# Patient Record
Sex: MALE | Race: BLACK OR AFRICAN AMERICAN | NOT HISPANIC OR LATINO | Employment: UNEMPLOYED | URBAN - METROPOLITAN AREA
[De-identification: names, ages, dates, MRNs, and addresses within clinical notes are randomized per-mention and may not be internally consistent; named-entity substitution may affect disease eponyms.]

---

## 2017-03-27 ENCOUNTER — TELEPHONE (OUTPATIENT)
Dept: PODIATRY | Facility: CLINIC | Age: 62
End: 2017-03-27

## 2017-03-27 ENCOUNTER — OFFICE VISIT (OUTPATIENT)
Dept: PODIATRY | Facility: CLINIC | Age: 62
End: 2017-03-27
Payer: COMMERCIAL

## 2017-03-27 VITALS
WEIGHT: 185 LBS | DIASTOLIC BLOOD PRESSURE: 62 MMHG | BODY MASS INDEX: 27.4 KG/M2 | HEART RATE: 88 BPM | SYSTOLIC BLOOD PRESSURE: 104 MMHG | HEIGHT: 69 IN

## 2017-03-27 DIAGNOSIS — B35.3 TINEA PEDIS OF RIGHT FOOT: ICD-10-CM

## 2017-03-27 DIAGNOSIS — B35.3 TINEA PEDIS OF RIGHT FOOT: Primary | ICD-10-CM

## 2017-03-27 DIAGNOSIS — L03.119 CELLULITIS OF FOOT: ICD-10-CM

## 2017-03-27 DIAGNOSIS — B35.4 TINEA CORPORIS: ICD-10-CM

## 2017-03-27 PROCEDURE — 99999 PR PBB SHADOW E&M-EST. PATIENT-LVL IV: CPT | Mod: PBBFAC,,, | Performed by: PODIATRIST

## 2017-03-27 PROCEDURE — 99213 OFFICE O/P EST LOW 20 MIN: CPT | Mod: S$GLB,,, | Performed by: PODIATRIST

## 2017-03-27 PROCEDURE — 87077 CULTURE AEROBIC IDENTIFY: CPT

## 2017-03-27 PROCEDURE — 1160F RVW MEDS BY RX/DR IN RCRD: CPT | Mod: S$GLB,,, | Performed by: PODIATRIST

## 2017-03-27 PROCEDURE — 87070 CULTURE OTHR SPECIMN AEROBIC: CPT

## 2017-03-27 PROCEDURE — 87186 SC STD MICRODIL/AGAR DIL: CPT

## 2017-03-27 RX ORDER — CICLOPIROX 7.7 MG/G
GEL TOPICAL 2 TIMES DAILY
Qty: 45 TUBE | Refills: 2 | Status: SHIPPED | OUTPATIENT
Start: 2017-03-27 | End: 2018-03-23

## 2017-03-27 RX ORDER — TERBINAFINE HYDROCHLORIDE 250 MG/1
TABLET ORAL
Qty: 14 TABLET | Refills: 0 | Status: SHIPPED | OUTPATIENT
Start: 2017-03-27 | End: 2018-03-23

## 2017-03-27 NOTE — PATIENT INSTRUCTIONS
Fungal Skin Infection (Tinea)  A fungal infection is when too much fungus grows on or in the body. Fungus normally lives on the skin in small amounts and does not cause harm. But when too much grows on the skin, it causes an infection. This is also known as tinea. Fungal skin infections are common and not often serious.  The infection often starts as a small red area the size of a pea. The skin may turn dry and flaky. The area may itch. As the fungus grows, it spreads out in a red [place]. Because of how it looks, fungal skin infection is often called ringworm, but it is not caused by a worm. Fungal skin infections can occur on many parts of the body. They can grow on the head, chest, arms, or legs. They can occur on the buttocks. On the feet, fungal infection is known as athletes foot. It causes itchy, sometimes painful sores between the toes and the bottom or sides of the feet. In the groin, the rash is called jock itch.  People with weakened immune systems can get a fungal infection more easily. This includes people with diabetes or HIV, or who are being treated for cancer. In these cases, the fungal infection can spread and cause severe illness. Fungal infections are also more common in people who are obese.  In most cases, treatment is done with antifungal cream or ointment. If the infection is on your scalp, you may take oral medication. In some cases, a tiny piece of the skin (biopsy) may be taken. This is so it can be tested in a lab.  Common fungal infections are treated with creams on the skin or oral medicine.  Home care  Follow all instructions when using antifungal cream or ointment on your skin. The health care provider may advise using cornstarch powder to keep your skin dry or petroleum jelly to provide a barrier.  General care:  · If you were prescribed an oral medicine, read the patient information. Talk with the health care provider about the risks and side effects.  · Let your skin dry  completely after bathing. Carefully dry your feet and between your toes.  · Dress in loose cotton clothing.  · Dont scratch the affected area. This can delay healing and may spread the infection. It can also cause a bacterial infection.  · Keep your skin clean, but dont wash the skin too much. This can irritate your skin.  · Keep in mind that it may take a week before the fungus starts to go away. It can take 2 to 4 weeks to fully clear. To prevent it from coming back, use the medicine until the rash is all gone.  Follow-up care  Follow up with your health care provider if the rash does not get better after 10 days of treatment. Also follow up if the rash spreads to other parts of your body.  When to seek medical advice  Call your health care provider right away if any of these occur:  · Fever of 100.4°F (38°C) or higher  · Redness or swelling that gets worse  · Pain that gets worse  · Foul-smelling fluid leaking from the skin  Date Last Reviewed: 7/23/2014 © 2000-2016 [employer]. [address] [place] [address]. All rights reserved. This information is not intended as a substitute for professional medical care. Always follow your healthcare professional's instructions.        Ringworm of the Skin    Ringworm is a fungal infection of the skin. Despite the name, a worm doesn't cause it. The cause of ringworm is a fungus that infects the outer layers of the skin. It is also not caused by bed bugs, scabies, or lice. These are totally different.  The medical term for ringworm is tinea. It can affect most parts of your body, although it seems to do better in moist areas of the body and around hair. It can be on almost any part of your body, including:  · Arms, hands, legs, chest, feet, and back  · Scalp  · Beard  · Groin  · Between the toes  Depending on where it is located, sometimes the name changes:  · Tinea capitis (scalp)  · Tinea cruris (groin)  · Tinea corporis (body)  · Tinea pedis  (feet)  Causes  Ringworm is very common all over the world, including the [place] It can take less than 1 week up to 2 weeks before you develop the infection after being exposed. So, you may not figure out the exact cause.  It is spread through direct contact with:  · An infected person or animal  · Infected soil, or objects such as towels, clothing, and bailey  Symptoms  At first you might not notice ringworm. Or you may just see a small, red, often raised itchy spot or pimple. Sometimes there may only be one spot. At other times there may be several. Ringworm can look slightly different on different parts of the body, but there are some things are always present:  · Irregular, round, oval or ring-shaped, which is why it's called ringworm  · Clearer or lighter color at the center, since it spreads from the center of the spot outward  · Red or inflamed look  · Raised  · Itchy  · Scaly, dry, or flaky  Home care  Follow these tips to help care for yourself at home:  · Leave it alone. Don't scratch at the rash or pick it. This can increase the chance of infection and scarring.  · Take medicine as prescribed. If you were prescribed a cream, apply it exactly as directed. Make sure to put the cream not just on the rash, but also on the skin 1 or 2 inches around it. Medicine by mouth is sometimes needed, particularly for ringworm on the scalp. Take it as directed and until your healthcare provider says to stop.  · Keep it from spreading to others. Untreated ringworm of the skin is contagious by skin-to-skin contact. Your child may return to school 2 days after treatment has started.  Prevention  To some degree, prevention depends on what part of your body was affected. In general, the following good hygiene can help.  · Clean up after you get dirty or sweaty, or after using a locker room.  · When possible, dont share bailey and brushes.  · Avoid having your skin and feet wet or damp for long periods.  · Wear clean,  loose-fitting underwear.  Follow-up care  Follow up with your healthcare provider as advised by our staff if the rash does not improve after 10 days of treatment or if the rash spreads to other areas of the body.  When to seek medical advice  Call your healthcare provider right away if any of these occur:  · Redness around the rash gets worse  · Fluid drains from the rash  · Fever of 100.4ºF (38ºC) or higher, or as directed by your healthcare provider  Date Last Reviewed: 8/1/2016 © 2000-2016 [employer]. [address] [place] [address]. All rights reserved. This information is not intended as a substitute for professional medical care. Always follow your healthcare professional's instructions.

## 2017-03-27 NOTE — PROGRESS NOTES
Subjective:      Patient ID: Gonzalez Cuevas is a 61 y.o. male.    Chief Complaint: Foot Problem (right ft.)    Gonzalez is a 61 y.o. male who presents with new rash/infection/blister at right ankle/leg. This started about 3 weeks ago with a growing lesion at right anterior shin. Several smaller spots has started in the last week. He was using topical antifungal with improvement noted but has run out. History of athletes foot and ring work at left arm. He gets the since of a breakout at the right and left arm. Denies injury.    Review of Systems   Constitution: Negative for chills, fever, weakness, malaise/fatigue and night sweats.   Cardiovascular: Negative for chest pain, leg swelling, orthopnea and palpitations.   Respiratory: Negative for cough, shortness of breath and wheezing.    Skin: Positive for itching and rash. Negative for color change.   Musculoskeletal: Negative for arthritis, gout, joint pain, joint swelling, muscle weakness and myalgias.   Gastrointestinal: Negative for abdominal pain, constipation and nausea.   Neurological: Negative for disturbances in coordination, dizziness, focal weakness, numbness and tremors.           Objective:      Physical Exam   Constitutional: He is oriented to person, place, and time. He appears well-developed. No distress.   HENT:   Head: Normocephalic and atraumatic.   Cardiovascular: Normal rate, regular rhythm and normal heart sounds.  Exam reveals no friction rub.    No murmur heard.  Pulses:       Dorsalis pedis pulses are 2+ on the right side, and 2+ on the left side.        Posterior tibial pulses are 2+ on the right side, and 2+ on the left side.   Pulmonary/Chest: Effort normal and breath sounds normal. No respiratory distress. He has no wheezes. He has no rales.   Abdominal: Soft. Bowel sounds are normal. He exhibits no distension. There is no tenderness. There is no guarding.   Musculoskeletal:        Right ankle: Normal. He exhibits no swelling, no  ecchymosis and no deformity. No tenderness. Achilles tendon normal.        Left ankle: Normal. He exhibits normal range of motion, no swelling, no ecchymosis and no deformity. Achilles tendon normal.        Right foot: Normal. There is normal range of motion, no tenderness, no swelling, normal capillary refill, no crepitus and no deformity.        Left foot: There is normal range of motion, no tenderness, no swelling, normal capillary refill, no crepitus and no deformity.   Feet:   Right Foot:   Protective Sensation: 10 sites tested. 10 sites sensed.   Skin Integrity: Positive for blister, skin breakdown, erythema and warmth.   Left Foot:   Protective Sensation: 10 sites tested. 10 sites sensed.   Skin Integrity: Negative for ulcer, blister, skin breakdown, erythema, warmth or callus.   Neurological: He is alert and oriented to person, place, and time. No cranial nerve deficit. Coordination and gait normal.   Reflex Scores:       Achilles reflexes are 2+ on the right side and 2+ on the left side.  Sensation at feet intact to 10 g SWM at 10 areas bilat.     Skin: Skin is warm and intact. Lesion and rash noted. No ecchymosis and no laceration noted. Rash is vesicular. No erythema. No pallor.        No blisters or lesion at forefoot.    Nursing note and vitals reviewed.            Assessment:       Encounter Diagnoses   Name Primary?    Tinea pedis of right foot Yes    Tinea corporis - Right Foot          Plan:       Gonzalez was seen today for foot problem.    Diagnoses and all orders for this visit:    Tinea pedis of right foot  -     terbinafine HCl (LAMISIL) 250 mg tablet; 1 pill by mouth daily x 14 days. Avoid alcohol intake while taking medication    Tinea corporis - Right Foot  -     terbinafine HCl (LAMISIL) 250 mg tablet; 1 pill by mouth daily x 14 days. Avoid alcohol intake while taking medication  -     Aerobic culture  -     Ambulatory consult to Dermatology      I counseled the patient on his  conditions, their implications and medical management.    Topical antifungal TWICE DAILY for at least 1 month.  Side effects of medication(s) were discussed in detail and patient voiced understanding. Patient will call back for any issues or complications.       Blister cultured to rule out bacterial infection.    Referral to Derm as infection may be spreading to upper extremity.  .

## 2017-03-27 NOTE — TELEPHONE ENCOUNTER
----- Message from Paula Johnson sent at 3/27/2017  7:31 AM CDT -----  Contact: [phone]/ self   Pt its requesting an appointment for today in the main [place] clinic, pt states he has a rash all over his leg due a fungus. Please advise          Patient will be in the clinic today

## 2017-03-27 NOTE — MR AVS SNAPSHOT
[facility]  [address]  [facility] [address]  Phone: [phone]                  Gonzalez Cuevas   3/27/2017 2:15 PM   Office Visit    Description:  Male : 1955   Provider:  Amaury Barraza DPM   Department:  [facility]           Reason for Visit     Foot Problem           Diagnoses this Visit        Comments    Tinea pedis of right foot    -  Primary     Tinea corporis                To Do List           Goals (5 Years of Data)     None      [facility]     [facility] Care Line -  Assistance  Registered nurses in the [facility] provide clinical advisement, health education, appointment booking, and other advisory services.  Call for this free service at [phone].             Medications           Message regarding Medications     Verify the changes and/or additions to your medication regime listed below are the same as discussed with your clinician today.  If any of these changes or additions are incorrect, please notify your healthcare provider.             Verify that the below list of medications is an accurate representation of the medications you are currently taking.  If none reported, the list may be blank. If incorrect, please contact your healthcare provider. Carry this list with you in case of emergency.           Current Medications     ascorbic acid (VITAMIN C) 500 mg Chew Take 1 tablet by mouth as needed.    ciclopirox 0.77 % Gel Apply topically 2 (two) times daily.    cyclobenzaprine (FLEXERIL) 5 MG tablet Take 1 tablet (5 mg total) by mouth 3 (three) times daily as needed for Muscle spasms.    naproxen (NAPROSYN) 500 MG tablet Take 1 tablet (500 mg total) by mouth 2 (two) times daily.    predniSONE (DELTASONE) 10 MG tablet take 3 TABS every morning for 2 days then 2 TABS every morning fo...  (REFER TO PRESCRIPTION NOTES).    SAW PALMETTO ORAL Take 1 capsule by mouth once daily.            Clinical Reference  "Information           Your Vitals Were     BP Pulse Height Weight BMI    104/62 88 5' 9" (1.753 m) 83.9 kg (185 lb) 27.32 kg/m2      Blood Pressure          Most Recent Value    BP  104/62      Allergies as of 3/27/2017     No Known Allergies      Immunizations Administered on Date of Encounter - 3/27/2017     None      Language Assistance Services     ATTENTION: Language assistance services are available, free of charge. Please call [phone].      ATENCIÓN: Si habla español, tiene a patel disposición servicios gratuitos de asistencia lingüística. Llame al [phone].     ELIZABETH Ý: N?u b?n nói Ti?ng Vi?t, có các d?ch v? h? tr? ngôn ng? mi?n phí dành cho b?n. G?i s? [phone].         [facility] complies with applicable Federal civil rights laws and does not discriminate on the basis of race, color, national origin, age, disability, or sex.        "

## 2017-03-29 ENCOUNTER — NURSE TRIAGE (OUTPATIENT)
Dept: ADMINISTRATIVE | Facility: CLINIC | Age: 62
End: 2017-03-29

## 2017-03-29 LAB — BACTERIA SPEC AEROBE CULT: NORMAL

## 2017-03-29 RX ORDER — CEPHALEXIN 500 MG/1
500 CAPSULE ORAL EVERY 12 HOURS
Qty: 20 CAPSULE | Refills: 0 | Status: SHIPPED | OUTPATIENT
Start: 2017-03-29 | End: 2017-04-08

## 2017-03-29 NOTE — TELEPHONE ENCOUNTER
Reason for Disposition   Caller has NON-URGENT medication question about med that PCP prescribed and triager unable to answer question    Protocols used: ST MEDICATION QUESTION CALL-A-AH    Gonzalez's wife, Kristina, called to say she is concerned about the possible side effects of the cephalexin prescribed today by Dr Amaury Barraza for her 's staph infection / cellulitis.  She states Gonzalez  has had urinary retention in the past, and prostate problems.  She will pick this medication up, she said, and he will take the first dose tonight, but she would like a call from Dr Barraza as soon as possible tomorrow morning to discuss the side effects (possible), and to know if there is something else he can take without those possible SE.  Message to Dr Barraza. Please contact caller directly with any additional care advice.

## 2017-03-30 ENCOUNTER — TELEPHONE (OUTPATIENT)
Dept: PODIATRY | Facility: CLINIC | Age: 62
End: 2017-03-30

## 2017-03-30 NOTE — TELEPHONE ENCOUNTER
Spoke to pat wife and she said pat start to take meds last night. She said she hasn't get a message yet from the manager from dermatology and she want me to check on this. Told her if she sees that her  has any side effects to stop taking antibiotic

## 2017-04-04 ENCOUNTER — TELEPHONE (OUTPATIENT)
Dept: FAMILY MEDICINE | Facility: CLINIC | Age: 62
End: 2017-04-04

## 2017-04-04 ENCOUNTER — OFFICE VISIT (OUTPATIENT)
Dept: DERMATOLOGY | Facility: CLINIC | Age: 62
End: 2017-04-04
Payer: COMMERCIAL

## 2017-04-04 DIAGNOSIS — L30.0 NUMMULAR ECZEMATOUS DERMATITIS: Primary | ICD-10-CM

## 2017-04-04 DIAGNOSIS — L30.8 VESICULAR DERMATITIS: ICD-10-CM

## 2017-04-04 PROCEDURE — 99999 PR PBB SHADOW E&M-EST. PATIENT-LVL II: CPT | Mod: PBBFAC,,, | Performed by: DERMATOLOGY

## 2017-04-04 PROCEDURE — 99203 OFFICE O/P NEW LOW 30 MIN: CPT | Mod: S$GLB,,, | Performed by: DERMATOLOGY

## 2017-04-04 PROCEDURE — 87290 VARICELLA ZOSTER AG IF: CPT

## 2017-04-04 PROCEDURE — 1160F RVW MEDS BY RX/DR IN RCRD: CPT | Mod: S$GLB,,, | Performed by: DERMATOLOGY

## 2017-04-04 PROCEDURE — 87300 AG DETECTION POLYVAL IF: CPT

## 2017-04-04 RX ORDER — FLUOCINONIDE 0.5 MG/G
CREAM TOPICAL 2 TIMES DAILY
Qty: 60 G | Refills: 1 | Status: SHIPPED | OUTPATIENT
Start: 2017-04-04 | End: 2018-03-23

## 2017-04-04 RX ORDER — FLUOCINONIDE TOPICAL SOLUTION USP, 0.05% 0.5 MG/ML
SOLUTION TOPICAL 2 TIMES DAILY
Qty: 60 ML | Refills: 1 | Status: SHIPPED | OUTPATIENT
Start: 2017-04-04 | End: 2017-04-04 | Stop reason: ALTCHOICE

## 2017-04-04 RX ORDER — DOXYCYCLINE 100 MG/1
100 CAPSULE ORAL EVERY 12 HOURS
Qty: 28 CAPSULE | Refills: 0 | Status: SHIPPED | OUTPATIENT
Start: 2017-04-04 | End: 2017-04-18

## 2017-04-04 NOTE — LETTER
April 4, 2017      Ashkan Ceja MD  [address]  [address]  [facility] [address]           [facility]  [address]  [place] [address]  Phone: [phone]  Fax: [phone]          Patient: Gonzalez Cuevas   MR Number: [id number]   YOB: 1955   Date of Visit: 4/4/2017       Dear Dr. Ashkan Ceja:    Thank you for referring Gonzalez Cuevas to me for evaluation. Attached you will find relevant portions of my assessment and plan of care.    If you have questions, please do not hesitate to call me. I look forward to following Gonzalez Cuevas along with you.    Sincerely,    Caroline Garnett MD    Enclosure  CC:  No Recipients    If you would like to receive this communication electronically, please contact [email or web address] or [phone] to request more information on [employer] access.    For providers and/or their staff who would like to refer a patient to [facility], please contact us through our one-stop-shop provider referral line, [facility], at [phone].    If you feel you have received this communication in error or would no longer like to receive these types of communications, please e-mail [email or web address]

## 2017-04-04 NOTE — PROGRESS NOTES
Subjective:       Patient ID:  Gonzalez Cuevas is a 61 y.o. male who presents for   Chief Complaint   Patient presents with    Rash     R foot, R leg, L axilla, L hand & arm, x 2 wks, itchy, tx calamine lotion, tea tree oil & alcohol      Rash  - Initial  Affected locations: right lower leg, right foot, left axilla, left arm and left hand  Duration: 2 weeks  Signs / symptoms: itching  Timing: constant  Treatments tried: calamine lotion, tea tree oil, & alcohol; ciclopirox.  Improvement on treatment: no relief    Rash seemed to started on foot and spread proximally  Upon further questioning, they were in [place] area before the rash started  On Keflex for MSSA (culture on 3/27/17)     Past Medical History:   Diagnosis Date    Bleeding stomach ulcer 1992,1994    Encounter for blood transfusion     Hearing difficulty     Retinal tear of right eye 2015    with floaters    Syncope and collapse     4x since 2005    Ulcer     Vertigo      Review of Systems   Constitutional: Negative for fever, chills, weight loss, fatigue, night sweats and malaise.   Gastrointestinal: Negative for indigestion.   Skin: Positive for rash and activity-related sunscreen use. Negative for daily sunscreen use and recent sunburn.   Hematologic/Lymphatic: Negative for adenopathy. Does not bruise/bleed easily.        Objective:    Physical Exam   Constitutional: He appears well-developed and well-nourished. No distress.   Neurological: He is alert and oriented to person, place, and time. He is not disoriented.   Psychiatric: He has a normal mood and affect.   Skin:   Areas Examined (abnormalities noted in diagram):   Scalp / Hair Palpated and Inspected  Head / Face Inspection Performed  Neck Inspection Performed  Chest / Axilla Inspection Performed  Abdomen Inspection Performed  Back Inspection Performed  RUE Inspected  LUE Inspection Performed  RLE Inspected  LLE Inspection Performed  Nails and Digits Inspection Performed               Diagram Legend     Erythematous scaling macule/papule c/w actinic keratosis       Vascular papule c/w angioma      Pigmented verrucoid papule/plaque c/w seborrheic keratosis      Yellow umbilicated papule c/w sebaceous hyperplasia      Irregularly shaped tan macule c/w lentigo     1-2 mm smooth white papules consistent with Milia      Movable subcutaneous cyst with punctum c/w epidermal inclusion cyst      Subcutaneous movable cyst c/w pilar cyst      Firm pink to brown papule c/w dermatofibroma      Pedunculated fleshy papule(s) c/w skin tag(s)      Evenly pigmented macule c/w junctional nevus     Mildly variegated pigmented, slightly irregular-bordered macule c/w mildly atypical nevus      Flesh colored to evenly pigmented papule c/w intradermal nevus       Pink pearly papule/plaque c/w basal cell carcinoma      Erythematous hyperkeratotic cursted plaque c/w SCC      Surgical scar with no sign of skin cancer recurrence      Open and closed comedones      Inflammatory papules and pustules      Verrucoid papule consistent consistent with wart     Erythematous eczematous patches and plaques     Dystrophic onycholytic nail with subungual debris c/w onychomycosis     Umbilicated papule    Erythematous-base heme-crusted tan verrucoid plaque consistent with inflamed seborrheic keratosis     Erythematous Silvery Scaling Plaque c/w Psoriasis     See annotation    Culture 3/27/17  MSSA (being treated with Keflex)    Assessment / Plan:      1) Eczema:  - Primary lesions most consistent with nummular eczema: asteatotic features, background follicular prominence, very eczematous appearing plaques on lower legs, dyshydrotic tiny vesicles on lateral fingers.  - Explained this condition often occurs in men on lower legs in 6th+ decades of life.  - Also explained that lesions can become secondarily infected, either with virus (HSV/VZV; pt has h/o cold sores) or bacteria, which seems to be the case.  - Start Lidex to dry,  non-infectious appearing eczema lesions BID. Can apply to dyshidrotic lesions on hands.     2) Vesicular dermatitis:  - Suspect secondary infection with underlying xerotic eczema.  - Considered bullous tinea: KOH scraping negative for hyphae.  - Prior recent wound swab + MSSA, R to PCNs but S to Tetracyclines, Erythromycin, Bactrim, Clindamycin, Vancomycin.  - Suspected persistent MSSA vs bullous impetigo with strep.   - Start Doxycycline 100 mg BID x 14 days.  - Sent DFA of vesicular fluid for HSV/VZV testing. Will call patient with results.   - Gentle skin care. Avoid harsh soaps or cleansing products. Try to stop scratching.     RTC 3 weeks.

## 2017-04-05 LAB
DFA SPECIMEN: NORMAL
DFA SPECIMEN: NORMAL
HSV AG, DFA: NEGATIVE
VARICELLA ZOSTER VIRUS DFA: NEGATIVE

## 2018-03-23 ENCOUNTER — OFFICE VISIT (OUTPATIENT)
Dept: FAMILY MEDICINE | Facility: CLINIC | Age: 63
End: 2018-03-23
Payer: MEDICARE

## 2018-03-23 ENCOUNTER — OFFICE VISIT (OUTPATIENT)
Dept: UROLOGY | Facility: CLINIC | Age: 63
End: 2018-03-23
Payer: MEDICARE

## 2018-03-23 ENCOUNTER — LAB VISIT (OUTPATIENT)
Dept: LAB | Facility: HOSPITAL | Age: 63
End: 2018-03-23
Attending: UROLOGY
Payer: MEDICARE

## 2018-03-23 VITALS
SYSTOLIC BLOOD PRESSURE: 138 MMHG | HEIGHT: 69 IN | BODY MASS INDEX: 27.4 KG/M2 | DIASTOLIC BLOOD PRESSURE: 89 MMHG | WEIGHT: 185 LBS | HEART RATE: 66 BPM

## 2018-03-23 VITALS
SYSTOLIC BLOOD PRESSURE: 130 MMHG | TEMPERATURE: 98 F | OXYGEN SATURATION: 98 % | BODY MASS INDEX: 30.69 KG/M2 | DIASTOLIC BLOOD PRESSURE: 80 MMHG | HEART RATE: 62 BPM | WEIGHT: 190.94 LBS | HEIGHT: 66 IN

## 2018-03-23 DIAGNOSIS — R29.2 HYPERREFLEXIA OF LOWER EXTREMITY: ICD-10-CM

## 2018-03-23 DIAGNOSIS — R33.9 INCOMPLETE BLADDER EMPTYING: ICD-10-CM

## 2018-03-23 DIAGNOSIS — N39.0 ACUTE UTI: Primary | ICD-10-CM

## 2018-03-23 DIAGNOSIS — R35.1 BPH ASSOCIATED WITH NOCTURIA: ICD-10-CM

## 2018-03-23 DIAGNOSIS — R29.898 LEFT LEG WEAKNESS: ICD-10-CM

## 2018-03-23 DIAGNOSIS — G89.29 CHRONIC MIDLINE LOW BACK PAIN WITHOUT SCIATICA: ICD-10-CM

## 2018-03-23 DIAGNOSIS — M54.50 CHRONIC MIDLINE LOW BACK PAIN WITHOUT SCIATICA: ICD-10-CM

## 2018-03-23 DIAGNOSIS — N40.1 BPH ASSOCIATED WITH NOCTURIA: ICD-10-CM

## 2018-03-23 DIAGNOSIS — M54.50 BILATERAL LOW BACK PAIN WITHOUT SCIATICA, UNSPECIFIED CHRONICITY: Primary | ICD-10-CM

## 2018-03-23 DIAGNOSIS — L30.9 DERMATITIS: ICD-10-CM

## 2018-03-23 DIAGNOSIS — Z80.42 FAMILY HISTORY OF PROSTATE CANCER IN FATHER: ICD-10-CM

## 2018-03-23 DIAGNOSIS — R97.20 ELEVATED PSA: Primary | ICD-10-CM

## 2018-03-23 DIAGNOSIS — N39.41 URGE INCONTINENCE: ICD-10-CM

## 2018-03-23 LAB
ALBUMIN SERPL BCP-MCNC: 4 G/DL
ALP SERPL-CCNC: 77 U/L
ALT SERPL W/O P-5'-P-CCNC: 12 U/L
ANION GAP SERPL CALC-SCNC: 7 MMOL/L
AST SERPL-CCNC: 23 U/L
BILIRUB SERPL-MCNC: 1.2 MG/DL
BUN SERPL-MCNC: 13 MG/DL
CALCIUM SERPL-MCNC: 9.5 MG/DL
CHLORIDE SERPL-SCNC: 108 MMOL/L
CO2 SERPL-SCNC: 27 MMOL/L
COMPLEXED PSA SERPL-MCNC: 4 NG/ML
CREAT SERPL-MCNC: 1 MG/DL
EST. GFR  (AFRICAN AMERICAN): >60 ML/MIN/1.73 M^2
EST. GFR  (NON AFRICAN AMERICAN): >60 ML/MIN/1.73 M^2
GLUCOSE SERPL-MCNC: 84 MG/DL
POTASSIUM SERPL-SCNC: 4.1 MMOL/L
PROT SERPL-MCNC: 7.2 G/DL
SODIUM SERPL-SCNC: 142 MMOL/L

## 2018-03-23 PROCEDURE — 36415 COLL VENOUS BLD VENIPUNCTURE: CPT

## 2018-03-23 PROCEDURE — 87086 URINE CULTURE/COLONY COUNT: CPT

## 2018-03-23 PROCEDURE — 80053 COMPREHEN METABOLIC PANEL: CPT

## 2018-03-23 PROCEDURE — 99213 OFFICE O/P EST LOW 20 MIN: CPT | Mod: PBBFAC,27 | Performed by: UROLOGY

## 2018-03-23 PROCEDURE — 99215 OFFICE O/P EST HI 40 MIN: CPT | Mod: PBBFAC,PO | Performed by: FAMILY MEDICINE

## 2018-03-23 PROCEDURE — 99215 OFFICE O/P EST HI 40 MIN: CPT | Mod: S$PBB,,, | Performed by: FAMILY MEDICINE

## 2018-03-23 PROCEDURE — 87088 URINE BACTERIA CULTURE: CPT

## 2018-03-23 PROCEDURE — 99999 PR PBB SHADOW E&M-EST. PATIENT-LVL V: CPT | Mod: PBBFAC,,, | Performed by: FAMILY MEDICINE

## 2018-03-23 PROCEDURE — 99999 PR PBB SHADOW E&M-EST. PATIENT-LVL III: CPT | Mod: PBBFAC,,, | Performed by: UROLOGY

## 2018-03-23 PROCEDURE — 99204 OFFICE O/P NEW MOD 45 MIN: CPT | Mod: S$PBB,,, | Performed by: UROLOGY

## 2018-03-23 PROCEDURE — 84153 ASSAY OF PSA TOTAL: CPT

## 2018-03-23 RX ORDER — CYCLOBENZAPRINE HCL 5 MG
5 TABLET ORAL 3 TIMES DAILY PRN
Qty: 30 TABLET | Refills: 1 | Status: SHIPPED | OUTPATIENT
Start: 2018-03-23 | End: 2018-04-02

## 2018-03-23 RX ORDER — LIDOCAINE HYDROCHLORIDE 20 MG/ML
JELLY TOPICAL ONCE
Status: CANCELLED | OUTPATIENT
Start: 2018-03-23 | End: 2018-03-23

## 2018-03-23 RX ORDER — CIPROFLOXACIN 500 MG/1
500 TABLET ORAL 2 TIMES DAILY
Qty: 6 TABLET | Refills: 0 | Status: SHIPPED | OUTPATIENT
Start: 2018-03-23 | End: 2018-03-26

## 2018-03-23 RX ORDER — LIDOCAINE HYDROCHLORIDE 10 MG/ML
10 INJECTION INFILTRATION; PERINEURAL ONCE
Status: CANCELLED | OUTPATIENT
Start: 2018-03-23 | End: 2018-03-23

## 2018-03-23 RX ORDER — TRIAMCINOLONE ACETONIDE 1 MG/G
CREAM TOPICAL 2 TIMES DAILY
Qty: 80 G | Refills: 1 | Status: SHIPPED | OUTPATIENT
Start: 2018-03-23 | End: 2022-05-20

## 2018-03-23 RX ORDER — CYCLOBENZAPRINE HCL 5 MG
5 TABLET ORAL 3 TIMES DAILY PRN
Qty: 30 TABLET | Refills: 1 | Status: SHIPPED | OUTPATIENT
Start: 2018-03-23 | End: 2018-03-23 | Stop reason: SDUPTHER

## 2018-03-23 NOTE — PROGRESS NOTES
CC: incomplete bladder emptying, incontincne    Gonzalez Cuevas is a 62 y.o. man who is here for the evaluation of Nocturia    A new pt referred by his PCP, Dr. Ceja.  Hx of incomplete bladder emptying, followed by Dr. James in the past.  C/o frequency, urge incontinence, intermittent urinary retention requiring SIC.  Has been doing SIC every other day resulting 300 to 600 ml PVR.  He only voids 30 ml per each urination.  Denies fever or chills.  Denies flank pain, dysuira, hematuria.      Wife is a retired .    Past Medical History:   Diagnosis Date    Bleeding stomach ulcer 1992,1994    DDD (degenerative disc disease), lumbar     Encounter for blood transfusion     Hearing difficulty     Retinal tear of right eye 2015    with floaters    Syncope and collapse     4x since 2005    Ulcer     Vertigo      Past Surgical History:   Procedure Laterality Date    BLADDER SURGERY      blockage age 10-11    LIPOMA RESECTION  2016    posterior neck     Social History   Substance Use Topics    Smoking status: Never Smoker    Smokeless tobacco: Not on file    Alcohol use No     Family History   Problem Relation Age of Onset    Diabetes Mother     Hypertension Mother     Cancer Father      Prostate Ca    Hypertension Father     Cancer Sister      intestional ca    Hypertension Brother      Allergy:  Review of patient's allergies indicates:  No Known Allergies  Outpatient Encounter Prescriptions as of 3/23/2018   Medication Sig Dispense Refill    ascorbic acid (VITAMIN C) 500 mg Chew Take 1 tablet by mouth as needed.      SAW PALMETTO ORAL Take 1 capsule by mouth once daily.       [DISCONTINUED] ciclopirox 0.77 % Gel Apply topically 2 (two) times daily. 45 Tube 2    [DISCONTINUED] fluocinonide 0.05% (LIDEX) 0.05 % cream Apply topically 2 (two) times daily. 60 g 1    [DISCONTINUED] terbinafine HCl (LAMISIL) 250 mg tablet 1 pill by mouth daily x 14 days. Avoid alcohol intake while taking  medication 14 tablet 0     No facility-administered encounter medications on file as of 3/23/2018.      Review of Systems   General ROS: GENERAL:  No weight gain or loss  SKIN:  No rashes or lacerations  HEAD:  No headaches  EYES:  No exophthalmos, jaundice or blindness  EARS:  No dizziness, tinnitus or hearing loss  NOSE:  No changes in smell  MOUTH & THROAT:  No dyskinetic movements or obvious goiter  CHEST:  No shortness of breath, hyperventilation or cough  CARDIOVASCULAR:  No tachycardia or chest pain  ABDOMEN:  No nausea, vomiting, pain, constipation or diarrhea  URINARY:  No frequency, dysuria or sexual dysfunction  ENDOCRINE:  No polydipsia, polyuria  MUSCULOSKELETAL:  No pain or stiffness of the joints  NEUROLOGIC:  No weakness, sensory changes, seizures, confusion, memory loss, tremor or other abnormal movements  Physical Exam     Vitals:    03/23/18 0914   BP: 138/89   Pulse: 66     Physical Exam   Constitutional: He is oriented to person, place, and time. He appears well-developed and well-nourished. No distress.   HENT:   Head: Normocephalic and atraumatic.   Right Ear: External ear normal.   Left Ear: External ear normal.   Nose: Nose normal.   Mouth/Throat: Oropharynx is clear and moist.   Eyes: Conjunctivae are normal. Pupils are equal, round, and reactive to light.   Neck: Normal range of motion. Neck supple. No JVD present. No tracheal deviation present. No thyromegaly present.   Cardiovascular: Normal rate, regular rhythm, normal heart sounds and intact distal pulses.  Exam reveals no gallop and no friction rub.    No murmur heard.  Pulmonary/Chest: Effort normal and breath sounds normal. No respiratory distress. He has no wheezes. He exhibits no tenderness.   Abdominal: Soft. Bowel sounds are normal. He exhibits no distension and no mass. There is no tenderness. There is no rebound and no guarding.   Genitourinary: Rectum normal and penis normal. No penile tenderness.   Genitourinary Comments:  Prostate 40 grams with negative nodule or negative tenderness     Musculoskeletal: Normal range of motion. He exhibits no edema, tenderness or deformity.   Lymphadenopathy:     He has no cervical adenopathy.   Neurological: He is alert and oriented to person, place, and time.   Skin: Skin is warm and dry. He is not diaphoretic.     Psychiatric: He has a normal mood and affect. His behavior is normal. Thought content normal.     Genitalia:  Scrotum: no rash or lesion  Normal symmetric epididymis without masses  Normal vas palpated  Normal size, symmetric testicles with no masses   Normal urethral meatus with no discharge  Normal circumcised penis with no lesion   Rectal:  Normal perineum and anus upon inspection.  Normal tone, no masses or tenderness;     LABS:  Lab Results   Component Value Date    PSA 1.6 11/12/2009    PSA 2.0 09/15/2008    PSA 1.4 05/24/2006    PSA 1.3 10/12/2004    PSADIAG 4.0 03/23/2018     No results found for this or any previous visit.  Lab Results   Component Value Date    CREATININE 1.0 03/23/2018    CREATININE 1.1 12/30/2016    CREATININE 1.2 09/10/2015     No results found for this or any previous visit.  Urine Culture, Routine   Date Value Ref Range Status   11/02/2015   Final    Multiple organisms isolated. None in predominance.  Repeat if   11/02/2015 clinically necessary.  Final       Assessment and Plan:  Gonzalez was seen today for nocturia.    Diagnoses and all orders for this visit:    Acute UTI  -     POCT urine dipstick without microscope  -     Urine culture  -     Simple Urodynamics w/ Cysto; Future    BPH associated with nocturia  -     Prostate Specific Antigen, Diagnostic; Future  -     Simple Urodynamics w/ Cysto; Future    Incomplete bladder emptying  -     Comprehensive metabolic panel; Future  -     Simple Urodynamics w/ Cysto; Future  -     US Retroperitoneal Limited; Future    Family history of prostate cancer in father    Chronic midline low back pain without  sciatica    Urge incontinence  -     Simple Urodynamics w/ Cysto; Future    urine culture today.  He is not interested in using flomax due to listed side effects.  Has been on saw palmetto and he can continue it.  Increase CIC to 4 x a day indefinitely.  Keep the total bladder capacity less than 500 ml, in order to consider Interstim therapy in the future.    Will further evaluate him with SUDS cysto.  Voiding diary with cath PVR for 3 days recommended.    Follow-up:  Follow-up for suds cysto, voiding diary.

## 2018-03-23 NOTE — PROGRESS NOTES
Lab Results   Component Value Date    PSA 1.6 11/12/2009    PSADIAG 4.0 03/23/2018     His PSA is elevated.  Please schedule TRUS bx of prostate after SUDS cysto.  Diagnoses and all orders for this visit:    Elevated PSA  -     Transrectal Ultrasound w/ Biopsy; Future  -     Tissue Specimen To Pathology, Urology; Standing  -     ciprofloxacin HCl (CIPRO) 500 MG tablet; Take 1 tablet (500 mg total) by mouth 2 (two) times daily.  -     sodium phosphates (FLEET ENEMA) 19-7 gram/118 mL Enem; Place 1 enema rectally once.    Other orders  -     lidocaine HCL 2% jelly; Apply topically once.  -     lidocaine HCL 10 mg/ml (1%) injection 10 mL; 10 mLs by Infiltration route once.

## 2018-03-23 NOTE — PATIENT INSTRUCTIONS
CETAPHIL cream moisturizer daily    Triamcinolone cream (steroid) just twice daily for 1-2 weeks for flare up of rash    LOW BACK PAIN EXERCISES    Exercises that stretch and strengthen the muscles of your abdomen and spine can help prevent back problems. Strong back and abdominal muscles help you keep good posture, with your spine in its correct position.  If your muscles are tight, take a warm shower or bath before doing the exercises. Exercise on a rug or mat. Wear loose clothing. Dont wear shoes. Stop doing any exercise that causes pain until you have talked with your healthcare provider.  Ask your provider or physical therapist to help you develop an exercise program. Ask your provider how many times a week you need to do the exercises. Remember to start slowly.   Exercises  These exercises are intended only as suggestions. Be sure to check with your provider before starting the exercises.   Standing hamstring stretch: Put the heel of one leg on a stool about 15 inches high. Keep your leg straight. Lean forward, bending at the hips until you feel a mild stretch in the back of your thigh. Make sure you do not roll your shoulders or bend at the waist when doing this. You want to stretch your leg, not your lower back. Hold the stretch for 15 to 30 seconds. Repeat with each leg 3 times.   Cat and camel: Get down on your hands and knees. Let your stomach sag, allowing your back to curve downward. Hold this position for 5 seconds. Then arch your back and hold for 5 seconds. Do 2 sets of 15.   Quadruped arm and leg raise: Get down on your hands and knees. Pull in your belly button and tighten your abdominal muscles to stiffen your spine. While keeping your abdominals tight, raise one arm and the opposite leg away from you. Hold this position for 5 seconds. Lower your arm and leg slowly and change sides. Do this 10 times on each side.   Pelvic tilt: Lie on your back with your knees bent and your feet flat on the  floor. Pull your belly button in towards your spine and push your lower back into the floor, flattening your back. Hold this position for 15 seconds, then relax. Repeat 5 to 10 times.   Partial curl: Lie on your back with your knees bent and your feet flat on the floor. Draw in your abdomen and tighten your stomach muscles. With your hands stretched out in front of you, curl your upper body forward until your shoulders clear the floor. Hold this position for 3 seconds. Don't hold your breath. It helps to breathe out as you lift your shoulders. Relax back to the floor. Repeat 10 times. Build to 2 sets of 15. To challenge yourself, clasp your hands behind your head and keep your elbows out to your sides.   Gluteal stretch: Lie on your back with both knees bent. Rest your right ankle over the knee of your left leg. Grasp the thigh of the left leg and pull toward your chest. You will feel a stretch along the buttocks and possibly along the outside of your hip. Hold the stretch for 15 to 30 seconds. Then repeat the exercise with your left ankle over your right knee. Do the exercise 3 times with each leg.   Extension exercise   Lie face down on the floor for 5 minutes. If this hurts too much, lie face down with a pillow under your stomach. This should relieve your leg or back pain. When you can lie on your stomach for 5 minutes without a pillow, you can continue with Part B of this exercise.   After lying on your stomach for 5 minutes, prop yourself up on your elbows for another 5 minutes. If you can do this without having more leg or buttock pain, you can start doing part C of this exercise.   Lie on your stomach with your hands under your shoulders. Then press down on your hands and extend your elbows while keeping your hips flat on the floor. Hold for 1 second and lower yourself to the floor. Do 3 to 5 sets of 10 repetitions. Rest for 1 minute between sets. You should have no pain in your legs when you do this, but it  is normal to feel some pain in your lower back.   Do this exercise several times a day.  Side plank: Lie on your side with your legs, hips, and shoulders in a straight line. Prop yourself up onto your forearm with your elbow directly under your shoulder. Lift your hips off the floor and balance on your forearm and the outside of your foot. Try to hold this position for 15 seconds and then slowly lower your hip to the ground. Switch sides and repeat. Work up to holding for 1 minute. This exercise can be made easier by starting with your knees and hips flexed toward your chest.            Back Exercises: Leg Pull        To start, lie on your back with your knees bent and feet flat on the floor. Dont press your neck or lower back to the floor. Breathe deeply. You should feel comfortable and relaxed in this position.  · Pull one knee to your chest.  · Hold for 30-60 seconds. Return to starting position.  · Repeat 2 times.  · Switch legs.  · For a double leg pull, pull both legs to your chest at the same time. Repeat 2 times.  For your safety, check with your healthcare provider before starting an exercise program.   © 3742-1692 [employer]. [address] [place] [address], [place] [address]. All rights reserved. This information is not intended as a substitute for professional medical care. Always follow your healthcare professional's instructions.

## 2018-03-23 NOTE — PROGRESS NOTES
(Portions of this note were dictated using voice recognition software and may contain dictation related errors in spelling/grammar/syntax not found on text review)    CC:   Chief Complaint   Patient presents with    Back Pain       HPI: 62 y.o. male Last office visit 11/2/15.  Here for back pain.  Does have history of chronic low back pain discussed on last visit.  No NSAIDs secondary to prior peptic ulcer disease history.  At last visit he was given some Flexeril trial and given some back and hip conditioning exercises.  X-rays were obtained demonstrating lumbar DDD most severe at L4-L5.  There was some concern about some leg weakness  Had seen spinal orthopedics in 2016 and had MRI of the lumbar spine demonstrating multilevel lumbar on the lordosis with mild right neuroforaminal narrowing at L4-L5 and mild bilateral neuroforaminal narrowing at L5-S1.  Also had a thoracic spine MRI which was normal.  Was subsequently seen by neurology and there was some concern about some left leg asymmetry.  There was also concerned about potential cervical myelopathy causing some lower extremity clonus and hyperreflexia .he was ordered a cervical spine MRI and a brain MRI but these do not seem to have been done.  He was referred to physical therapy but this does not appear to have been done.  Was referred to pain management but this was not followed up on.    He presents today complaining of bilateral lower localized lumbar pain without radiation to his legs.  No paresthesias in his legs reported.  States the symptoms happened about 5 days ago.  He has a lot of stiffness in the back.  No fevers, chills.  No bowel or bladder issues.  Does get some headaches.  No upper extremity pain or weakness.  Notices a rash in his back as well that comes and goes, will get dry and irritated.    Past Medical History:   Diagnosis Date    Bleeding stomach ulcer 1992,1994    Encounter for blood transfusion     Hearing difficulty     Retinal  tear of right eye 2015    with floaters    Syncope and collapse     4x since 2005    Ulcer     Vertigo        Past Surgical History:   Procedure Laterality Date    BLADDER SURGERY      blockage age 10-11    LIPOMA RESECTION  2016    posterior neck       Family History   Problem Relation Age of Onset    Diabetes Mother     Hypertension Mother     Cancer Father      Prostate Ca    Hypertension Father     Cancer Sister      intestional ca    Hypertension Brother        Social History     Social History    Marital status:      Spouse name: N/A    Number of children: N/A    Years of education: N/A     Occupational History    Not on file.     Social History Main Topics    Smoking status: Never Smoker    Smokeless tobacco: Not on file    Alcohol use No    Drug use: No    Sexual activity: Not on file     Other Topics Concern    Not on file     Social History Narrative    No narrative on file     Lab Results   Component Value Date    WBC 6.44 07/22/2015    HGB 14.8 07/22/2015    HCT 44.6 07/22/2015     07/22/2015    CHOL 203 (H) 09/10/2015    TRIG 65 09/10/2015    HDL 55 09/10/2015    ALT 12 07/22/2015    AST 23 07/22/2015     09/10/2015    K 4.2 09/10/2015     09/10/2015    CREATININE 1.1 12/30/2016    CALCIUM 9.2 09/10/2015    BUN 12 09/10/2015    CO2 30 (H) 09/10/2015    PSA 1.6 11/12/2009    LDLCALC 135.0 09/10/2015     09/10/2015           ROS:  GENERAL: No fever, chills, fatigability or weight loss.  SKIN: Above.  HEAD: Occasional left-sided headaches  EYES: No visual changes  EARS: No ear pain or changes in hearing.  NOSE: No congestion or rhinorrhea.  MOUTH & THROAT: No hoarseness, change in voice, or sore throat.  NODES: Denies swollen glands.  CHEST: Denies STANLEY, cyanosis, wheezing, cough and sputum production.  CARDIOVASCULAR: Denies chest pain, PND, orthopnea.  ABDOMEN: No nausea, vomiting, or changes in bowel function.  URINARY: No flank pain, dysuria or  hematuria.  PERIPHERAL VASCULAR: No claudication or cyanosis.  MUSCULOSKELETAL: Above  NEUROLOGIC: Above.    Vital signs reviewed  PE:   APPEARANCE: Well nourished, well developed, in no acute distress.    HEAD: Normocephalic, atraumatic.  EYES:  .   Conjunctivae noninjected.PERRL  MSK/NEURO: Upper extremities: 2+ biceps and brachioradialis reflexes bilaterally, symmetric.  Normal  strength bilaterally, resisted elbow flexion bilaterally, resisted shoulder abduction bilaterally.  Lower extremities: 2+ patellar and ankle reflexes on the right.  4+ patellar reflex on the left with clonus, 2+ ankle reflex on the left.  Strength testing demonstrates 5/5 hip flexor, quadriceps, hamstring, foot dorsi flexor/plantar flexor strength on the right but 4+/5 hip flexor, quadriceps, 4/5 hamstring strength.  4+/5 dorsi flexor/plantar flexor   BACK: Negative straight leg raise bilaterally.  He does have bilateral paralumbar pain and spasm to palpation.  SKIN: He has a generalized area of hyperpigmentation xerosis of the skin noted midline lumbar spine with some mild scaling noted in this area along with another focus of rash in the upper gluteal cleft.  No vesicles/lie/pustules.  No erythema.    IMPRESSION  1. Bilateral low back pain without sciatica, unspecified chronicity    2. Hyperreflexia of lower extremity    3. Left leg weakness    4. Dermatitis            PLAN  Reviewed prior workup including spinal orthopedics notes, neurology notes, imaging as above.  He also had some neurology labs done from 2016 including B12, copper, ceruloplasmin which were within normal range.  Acute on chronic low back pain which seems fairly local: We will retry Flexeril, lumbar stretches, heat application.  Physical therapy referral has been placed.  However for his chronic left leg weakness and hyperreflexia, I mentioned to him that much of the workup that his neurologist had ordered has not been completed yet.  I would recommend a follow-up  point with his neurologist for reassessment and reordering of the initially intended workup.    Dermatitis, atopic versus contact.  Trial of triamcinolone cream twice a day to the affected areas for 1-2 weeks.  Daily moisturization with Cetaphil        Answers for HPI/ROS submitted by the patient on 3/23/2018   Back pain  Chronicity: chronic  Onset: more than 1 year ago  Frequency: constantly  Progression since onset: waxing and waning  Pain location: gluteal, lumbar spine, sacro-iliac  Pain quality: aching, shooting  Pain - numeric: 8/10  Aggravated by: bending, position, lying down, sitting, standing  Stiffness is present: all day  abdominal pain: No  bladder incontinence: Yes  bowel incontinence: No  chest pain: No  dysuria: No  fever: No  headaches: No  leg pain: No  numbness: No  perianal numbness: No  tingling: Yes  Pain severity: severe  Treatments tried: NSAIDs, bed rest, brace/corset, heat, walking  Improvement on treatment: mild

## 2018-03-25 ENCOUNTER — PATIENT MESSAGE (OUTPATIENT)
Dept: UROLOGY | Facility: CLINIC | Age: 63
End: 2018-03-25

## 2018-03-25 LAB — BACTERIA UR CULT: NORMAL

## 2018-03-27 ENCOUNTER — TELEPHONE (OUTPATIENT)
Dept: UROLOGY | Facility: CLINIC | Age: 63
End: 2018-03-27

## 2018-03-27 NOTE — TELEPHONE ENCOUNTER
----- Message from Wilmer Mallory MD sent at 3/26/2018  8:42 AM CDT -----  For now, let's have him do CIC 2 x a day then.  ----- Message -----  From: Alethea Parks LPN  Sent: 3/23/2018   3:42 PM  To: Wilmer Mallory MD    I called [facility] to order his catheters. He has medicare part B and has not met deductible. He would need to pay $184 for the first month and then $40 monthly after that. Wife was upset about the worrell. Nidia, the [facility] rep, tried to explain that catheters are medical devices and not medications even though they require a prescription. Wife upset and said that we should have supplied an entire month. She explained that the order is for 4 times a day and that we can not give 120 free samples. I did give them about 15 today and nidia said she could give them another 30 for free, but unfortunately she can not give more. Would it be ok for patient to cath twice a day until his suds next month?  Nidia will call her back Monday as the wife really didn't understand why he has a deductible, etc, etc. She said she is even willing to work on payment plan for the deductible, but can not eat the cost of $184

## 2018-03-27 NOTE — TELEPHONE ENCOUNTER
"I called him to tell him that he can cath twice a day for now, he was ok with that. Then the wife got on the phone and was asking several questions that I could not answer. She said she was told he had uti , but urine culture was quote "normal" he was given 3 days of cipro and took them already, she did not know it was for prostate biopsy. She wondering why he needs biopsy. Did you review all his past notes, psa resutls, etc, etc.   "

## 2018-03-27 NOTE — TELEPHONE ENCOUNTER
Lab Results   Component Value Date    PSA 1.6 11/12/2009    PSA 2.0 09/15/2008    PSA 1.4 05/24/2006    PSADIAG 4.0 03/23/2018     His urine culture grew coagulase negative strep, for which sensitivity is not done routinely.  Will not treat this unless he has symptoms.    Will plan TRUS bx of prostate bx after his SUDS cysto.  May need additional cipro because he used it already.

## 2018-04-03 ENCOUNTER — TELEPHONE (OUTPATIENT)
Dept: UROLOGY | Facility: CLINIC | Age: 63
End: 2018-04-03

## 2018-04-03 NOTE — TELEPHONE ENCOUNTER
----- Message from Sara San sent at 4/3/2018  8:49 AM CDT -----  Contact: Pt can be reached at [phone]  Pt is calling to speak with the nurse concerning lab results for March 23rd completed to be sent to [email or web address] please.    Pt is requesting a call back this morning asap.    Thank you!

## 2018-04-03 NOTE — TELEPHONE ENCOUNTER
Lab Results   Component Value Date    PSA 1.6 11/12/2009    PSA 2.0 09/15/2008    PSA 1.4 05/24/2006    PSADIAG 4.0 03/23/2018     Left a message.  He is already scheduled for SUDS cysto.  Will discuss his PSA result on his visit and will plan TRUS bx of prostate.

## 2018-04-03 NOTE — TELEPHONE ENCOUNTER
"States he would like the psa results sent to him. I notified him that they were released already and viewed per him. Patient states that he got the results, but not the normal range. I advised him that the range is also given and it is 0-4. Patient then wanted to know what his "specific" range should be for his age and wanted that range sent to him.  "

## 2018-04-19 ENCOUNTER — OFFICE VISIT (OUTPATIENT)
Dept: NEUROLOGY | Facility: CLINIC | Age: 63
End: 2018-04-19
Payer: MEDICARE

## 2018-04-19 VITALS
WEIGHT: 187 LBS | BODY MASS INDEX: 30.18 KG/M2 | SYSTOLIC BLOOD PRESSURE: 115 MMHG | HEART RATE: 84 BPM | DIASTOLIC BLOOD PRESSURE: 68 MMHG

## 2018-04-19 DIAGNOSIS — M54.30 SCIATICA, UNSPECIFIED LATERALITY: Primary | ICD-10-CM

## 2018-04-19 DIAGNOSIS — M54.50 CHRONIC MIDLINE LOW BACK PAIN WITHOUT SCIATICA: ICD-10-CM

## 2018-04-19 DIAGNOSIS — G89.29 CHRONIC MIDLINE LOW BACK PAIN WITHOUT SCIATICA: ICD-10-CM

## 2018-04-19 PROCEDURE — 99214 OFFICE O/P EST MOD 30 MIN: CPT | Mod: S$PBB,,, | Performed by: PSYCHIATRY & NEUROLOGY

## 2018-04-19 PROCEDURE — 99999 PR PBB SHADOW E&M-EST. PATIENT-LVL III: CPT | Mod: PBBFAC,,, | Performed by: PSYCHIATRY & NEUROLOGY

## 2018-04-19 PROCEDURE — 99213 OFFICE O/P EST LOW 20 MIN: CPT | Mod: PBBFAC,PO | Performed by: PSYCHIATRY & NEUROLOGY

## 2018-04-19 RX ORDER — GABAPENTIN 100 MG/1
100 CAPSULE ORAL 3 TIMES DAILY
Qty: 90 CAPSULE | Refills: 11 | Status: SHIPPED | OUTPATIENT
Start: 2018-04-19 | End: 2018-05-29

## 2018-04-19 NOTE — LETTER
April 19, 2018      Ashkan Ceja MD  [address]  [address]  [facility] [address]           [facility]  [address] [address]  Phone: [phone]  Fax: [phone]          Patient: Gonzalez Cuevas   MR Number: [id number]   YOB: 1955   Date of Visit: 4/19/2018       Dear Dr. Ashkan Ceja:    Thank you for referring Gonzalez Cuevas to me for evaluation. Attached you will find relevant portions of my assessment and plan of care.    If you have questions, please do not hesitate to call me. I look forward to following Gonzalez Cuevas along with you.    Sincerely,    Randall Gonzales MD    Enclosure  CC:  No Recipients    If you would like to receive this communication electronically, please contact [email or web address] or [phone] to request more information on [employer] access.    For providers and/or their staff who would like to refer a patient to [facility], please contact us through our one-stop-shop provider referral line, [facility], at [phone].    If you feel you have received this communication in error or would no longer like to receive these types of communications, please e-mail [email or web address]

## 2018-04-20 NOTE — ASSESSMENT & PLAN NOTE
-- MRI L spine  -- patient referred to PT  -- gabapentin 100 mg PRN   -- anticipate likely referral to pain management

## 2018-04-20 NOTE — PATIENT INSTRUCTIONS
Magnetic Resonance Imaging (MRI)     You will be asked to hold very still during the scan.     Magnetic resonance imaging (MRI) is a test that lets your doctor see detailed pictures of the inside of your body. MRI combines the use of strong magnets and radio waves to form an MRI image.  How do I get ready for an MRI?  · Follow any directions you are given for not eating or drinking before the test.  · Ask your provider if you should stop taking any medicine before the test.  · Follow your normal daily routine unless your provider tells you otherwise.  · You'll be asked to remove your watch, jewelry, hearing aids, credit cards, pens, pocket knives, eyeglasses, and other metal objects.  · You may be asked to remove your makeup. Makeup may contain some metal.  · Most MRI tests take 30 to 60 minutes. Depending on the type of MRI you are having, the test may take longer. Give yourself extra time to check in.     MRI uses strong magnets. Metal is affected by magnets and can distort the image. The magnet used in MRI can cause metal objects in your body to move. If you have a metal implant, you may not be able to have an MRI unless the implant is certified as MRI safe. People with these implants should not have an MRI:  · Ear (cochlear) implants  · Certain clips used for brain aneurysms  · Certain metal coils put in blood vessels  · Most defibrillators  · Most pacemakers  Be sure to tell the radiologist or technologist if you:  · Have had any previous surgeries  · Have a pacemaker, surgical clips, metal plate or pins, an artificial joint, staples or screws, ear (cochlear) implants, or other implants  · Wear a medicated adhesive patch  · Have metal splinters in your body  · Have implanted nerve stimulators or drug-infusion ports  · Have tattoos or body piercings. Some tattoo inks contain metal.  · Work with metal  · Have braces. You must remove any dental work.  · Have a bullet or other metal in your body  Also tell the  radiologist or technologist if you:  · Are pregnant or think you may be  · Are afraid of small, enclosed spaces (claustrophobic)  · Are allergic to X-ray dye (contrast medium), iodine, shellfish, or any medicines  · Have other allergies  · Are breastfeeding  · Have a history of cancer  · Have any serious health problems. This includes kidney disease or a liver transplant. You may not be able to have the contrast material used for MRI.   What happens during an MRI?  · You may be asked to wear a hospital gown.  · You may be given earplugs to wear if you need them.  · You may be injected with a special dye (contrast) that improves the MRI image.   · Youll lie down on a platform that slides into the magnet.  What happens after an MRI?  · You can get back to normal activities right away. If you were given contrast, it will pass naturally through your body within a day. You may be told to drink more water or other fluids during this time.   · Your doctor will discuss the test results with you during a follow-up appointment or over the phone.  · Your next appointment is: __________________  Date Last Reviewed: 6/2/2015  © 8235-8138 [email or web address] [address] [place] [address], [place] [address]. All rights reserved. This information is not intended as a substitute for professional medical care. Always follow your healthcare professional's instructions.

## 2018-04-20 NOTE — PROGRESS NOTES
[facility] NEUROLOGY  [facility] [place]    Date: April 19, 2018   Patient Name: Gonzalez Cuevas   MRN: [id number]   PCP: Ashkan Ceja  Referring Provider: Ashkan Ceja MD    Assessment:      This is Gonzalez Cuevas, 62 y.o. male with a history of bilateral lower extremity clonus and chronic lumbar back pain.  The patient has been lost to follow-up for over a year.  His primary concern today is a.  We will obtain updated MRI L spine and anticipate likely referral to physical therapy, which the patient has yet to complete.  Additionally, have discussed multiple therapeutic options for basic pain management with patient.  He is amenable to a trial of low-dose gabapentin only at this time. Of note, B12 and copper were normal when checked at patient's last visit.      Plan:      Problem List Items Addressed This Visit        Orthopedic    Chronic low back pain without sciatica    Current Assessment & Plan     -- MRI L spine  -- patient referred to PT  -- gabapentin 100 mg PRN   -- anticipate likely referral to pain management                Randall Gonzales MD  [facility]   Department of Neurology    Patient note was created using Dragon Dictation.  Any errors in syntax or even information may not have been identified and edited on initial review prior to signing this note.  Subjective:   Patient seen in consultation at the request of Dr. Hicks for the evaluation of hyperreflexia. A copy of this note will be sent to the referring physician.      HPI:   Mr. Gonzalez Cuevas is a 62 y.o. male who presents with a chief complaint of Hyperreflexia, left leg muscle spasms, and chronic low back pain.  The patient reports that since his last visit, he did not undergo MRI or follow-up with physical therapy due to insurance limitations and cost concerns.  He states that he continues to experience excruciating low back pain with severe sciatica, lower extremity weakness.  " He is unable to walk more than approximately 100 feet without developing marked neurogenic claudication.  He states that he has not taken any pain medication beyond BC powders, stating that he prefers only "natural remedies".  Today he inquires along with his wife who accompanies him and contributes to the history, about the use of medical marijuana for pain.      PAST MEDICAL HISTORY:  Past Medical History:   Diagnosis Date    Bleeding stomach ulcer 1992,1994    DDD (degenerative disc disease), lumbar     Encounter for blood transfusion     Hearing difficulty     Retinal tear of right eye 2015    with floaters    Syncope and collapse     4x since 2005    Ulcer     Vertigo      PAST SURGICAL HISTORY:  Past Surgical History:   Procedure Laterality Date    BLADDER SURGERY      blockage age 10-11    LIPOMA RESECTION  2016    posterior neck     CURRENT MEDS:  Current Outpatient Prescriptions   Medication Sig Dispense Refill    ascorbic acid (VITAMIN C) 500 mg Chew Take 1 tablet by mouth as needed.      SAW PALMETTO ORAL Take 1 capsule by mouth once daily.       triamcinolone acetonide 0.1% (KENALOG) 0.1 % cream Apply topically 2 (two) times daily. For rash 80 g 1    gabapentin (NEURONTIN) 100 MG capsule Take 1 capsule (100 mg total) by mouth 3 (three) times daily. 90 capsule 11     No current facility-administered medications for this visit.      ALLERGIES:  Review of patient's allergies indicates:  No Known Allergies    FAMILY HISTORY:  Family History   Problem Relation Age of Onset    Diabetes Mother     Hypertension Mother     Cancer Father      Prostate Ca    Hypertension Father     Cancer Sister      intestional ca    Hypertension Brother      SOCIAL HISTORY:  Social History   Substance Use Topics    Smoking status: Never Smoker    Smokeless tobacco: Not on file    Alcohol use No     Review of Systems:  12 review of systems is negative except for the symptoms mentioned in HPI.    "   Objective:     Vitals:    04/19/18 1626   BP: 115/68   Pulse: 84   Weight: 84.8 kg (187 lb)     General: NAD, well nourished   Eyes: no tearing, discharge, no erythema   ENT: moist mucous membranes of the oral cavity, nares patent    Neck: Supple, full range of motion  Cardiovascular: Warm and well perfused, pulses equal and symmetrical  Lungs: Normal work of breathing, normal chest wall excursions  Skin: No rash, lesions, or breakdown on exposed skin  Psychiatry: Mood and affect are appropriate   Abdomen: soft, non tender, non distended  Extremeties: No cyanosis, clubbing or edema.    Neurological   MENTAL STATUS: Alert and oriented to person, place, and time. Attention and concentration within normal limits. Speech without dysarthriaRecent and remote memory within normal limits   CRANIAL NERVES: Visual fields intact. PERRL. EOMI. Facial sensation intact. Face symmetrical. Hearing grossly intact. Full shoulder shrug bilaterally. Tongue protrudes midline   SENSORY: Sensation is intact to pin, light touch, vibration and temperature throughout.  Joint position perception intact.   MOTOR: Normal bulk and tone. Left thigh with visibly smaller circumference and subtly shorter L leg. No clear atrophy noted.  5/5 deltoid, biceps, triceps, interosseous, hand  bilaterally. 5/5 iliopsoas, knee extension/flexion, 4/5 L and 5/5 R foot dorsi/plantarflexion bilaterally. No fasciculations noted  REFLEXES: Symmetric and 2+ throughout UEs, 4+ at knees and 2+ at toes bilaterally. Toes down going bilaterally.   CEREBELLAR/COORDINATION/GAIT: Gait wide based with subtle drag of left foot and stride length.   Finger to nose intact.

## 2018-04-23 ENCOUNTER — HOSPITAL ENCOUNTER (OUTPATIENT)
Dept: RADIOLOGY | Facility: HOSPITAL | Age: 63
Discharge: HOME | End: 2018-04-23
Attending: PSYCHIATRY & NEUROLOGY
Payer: MEDICARE

## 2018-04-23 DIAGNOSIS — M54.30 SCIATICA, UNSPECIFIED LATERALITY: ICD-10-CM

## 2018-04-23 PROCEDURE — 72148 MRI LUMBAR SPINE W/O DYE: CPT | Mod: 26,,, | Performed by: RADIOLOGY

## 2018-04-23 PROCEDURE — 72148 MRI LUMBAR SPINE W/O DYE: CPT | Mod: TC

## 2018-05-17 ENCOUNTER — TELEPHONE (OUTPATIENT)
Dept: FAMILY MEDICINE | Facility: CLINIC | Age: 63
End: 2018-05-17

## 2018-05-29 ENCOUNTER — OFFICE VISIT (OUTPATIENT)
Dept: INTERNAL MEDICINE | Facility: CLINIC | Age: 63
End: 2018-05-29
Payer: MEDICARE

## 2018-05-29 ENCOUNTER — TELEPHONE (OUTPATIENT)
Dept: FAMILY MEDICINE | Facility: CLINIC | Age: 63
End: 2018-05-29

## 2018-05-29 ENCOUNTER — LAB VISIT (OUTPATIENT)
Dept: LAB | Facility: HOSPITAL | Age: 63
End: 2018-05-29
Attending: INTERNAL MEDICINE
Payer: MEDICARE

## 2018-05-29 VITALS
DIASTOLIC BLOOD PRESSURE: 80 MMHG | RESPIRATION RATE: 16 BRPM | TEMPERATURE: 97 F | WEIGHT: 183.06 LBS | SYSTOLIC BLOOD PRESSURE: 132 MMHG | BODY MASS INDEX: 29.42 KG/M2 | HEART RATE: 72 BPM | HEIGHT: 66 IN

## 2018-05-29 DIAGNOSIS — I95.1 ORTHOSTASIS: ICD-10-CM

## 2018-05-29 DIAGNOSIS — Z11.59 NEED FOR HEPATITIS C SCREENING TEST: ICD-10-CM

## 2018-05-29 DIAGNOSIS — R35.1 BPH ASSOCIATED WITH NOCTURIA: ICD-10-CM

## 2018-05-29 DIAGNOSIS — I95.1 ORTHOSTASIS: Primary | ICD-10-CM

## 2018-05-29 DIAGNOSIS — D50.0 IRON DEFICIENCY ANEMIA DUE TO CHRONIC BLOOD LOSS: ICD-10-CM

## 2018-05-29 DIAGNOSIS — N40.1 BPH ASSOCIATED WITH NOCTURIA: ICD-10-CM

## 2018-05-29 LAB
BASOPHILS # BLD AUTO: 0.03 K/UL
BASOPHILS NFR BLD: 0.6 %
CORTIS SERPL-MCNC: 5.9 UG/DL
DIFFERENTIAL METHOD: ABNORMAL
EOSINOPHIL # BLD AUTO: 0.2 K/UL
EOSINOPHIL NFR BLD: 3.4 %
ERYTHROCYTE [DISTWIDTH] IN BLOOD BY AUTOMATED COUNT: 13.8 %
HCT VFR BLD AUTO: 41.4 %
HGB BLD-MCNC: 12.8 G/DL
LYMPHOCYTES # BLD AUTO: 1.8 K/UL
LYMPHOCYTES NFR BLD: 35.7 %
MCH RBC QN AUTO: 28.9 PG
MCHC RBC AUTO-ENTMCNC: 30.9 G/DL
MCV RBC AUTO: 94 FL
MONOCYTES # BLD AUTO: 0.5 K/UL
MONOCYTES NFR BLD: 9.7 %
NEUTROPHILS # BLD AUTO: 2.5 K/UL
NEUTROPHILS NFR BLD: 50.4 %
PLATELET # BLD AUTO: 195 K/UL
PMV BLD AUTO: 11.2 FL
RBC # BLD AUTO: 4.43 M/UL
WBC # BLD AUTO: 4.93 K/UL

## 2018-05-29 PROCEDURE — 36415 COLL VENOUS BLD VENIPUNCTURE: CPT | Mod: PO

## 2018-05-29 PROCEDURE — 99215 OFFICE O/P EST HI 40 MIN: CPT | Mod: S$PBB,,, | Performed by: INTERNAL MEDICINE

## 2018-05-29 PROCEDURE — 99999 PR PBB SHADOW E&M-EST. PATIENT-LVL III: CPT | Mod: PBBFAC,,, | Performed by: INTERNAL MEDICINE

## 2018-05-29 PROCEDURE — 99213 OFFICE O/P EST LOW 20 MIN: CPT | Mod: PBBFAC,PN | Performed by: INTERNAL MEDICINE

## 2018-05-29 PROCEDURE — 82533 TOTAL CORTISOL: CPT | Mod: PO

## 2018-05-29 PROCEDURE — 86803 HEPATITIS C AB TEST: CPT | Mod: PO

## 2018-05-29 PROCEDURE — 85025 COMPLETE CBC W/AUTO DIFF WBC: CPT | Mod: PO

## 2018-05-29 RX ORDER — FINASTERIDE 5 MG/1
5 TABLET, FILM COATED ORAL DAILY
Qty: 90 TABLET | Refills: 3 | Status: SHIPPED | OUTPATIENT
Start: 2018-05-29 | End: 2018-12-10

## 2018-05-29 NOTE — TELEPHONE ENCOUNTER
Patient's wife called to schedule same day appointment for .  Patient went to [facility] and advised to follow up with PCP.  Patient's symptoms have worsened.  Advised that patient should go back to ER if symptoms have worsened.  Patient's wife insisted being seen in clinic. Patient's wife also stated we have access to [facility]'s EMR.  Tried to explain that we don't have that access here since we are not connected.  Patient's wife insisted that we do and 'that is what [facility] told them.' Offered same day appointment with Dr Macias in [facility].

## 2018-05-29 NOTE — TELEPHONE ENCOUNTER
----- Message from Radha Mauricio sent at 5/29/2018  9:11 AM CDT -----  Contact: [phone]/pt's wife Kristina Vargas would like to be seen today. Please advise.

## 2018-05-29 NOTE — PROGRESS NOTES
Subjective:       Patient ID: Gonzalez Cuevas is a 62 y.o. male.    Chief Complaint: Anorexia and Fatigue    HPI  Pt with a hx of BPH and PUD was having frequent orthostatic hypotension and syncope while on flomax.  No syncope since he stopped the flomax but still frequently orthostatic.  Pt denies CP, SOB, palpitations.  No melena, BRBPR.  Pt currently with an indwelling graves.  Pt feels tired and anorexic.  No abd pain.  Old records reviewed.  Review of Systems   All other systems reviewed and are negative.      Objective:      Physical Exam   Constitutional: He appears well-developed. No distress.   HENT:   Head: Normocephalic.   Mouth/Throat: Oropharynx is clear and moist.   Eyes: EOM are normal. No scleral icterus.   Neck: Normal range of motion. No tracheal deviation present.   Cardiovascular: Normal rate, regular rhythm, normal heart sounds and intact distal pulses.    Pulmonary/Chest: Effort normal and breath sounds normal. No respiratory distress.   Abdominal: Soft. Bowel sounds are normal. He exhibits no distension.   Genitourinary: Rectal exam shows guaiac negative stool.   Genitourinary Comments: Prostate> 60 g, hard R lobe> L lobe   Musculoskeletal: Normal range of motion. He exhibits no edema.   Neurological: He is alert.   Skin: Skin is warm and dry. No rash noted. He is not diaphoretic. No erythema.   Psychiatric: He has a normal mood and affect. His behavior is normal.   Vitals reviewed.      Assessment:       1. Orthostasis    2. BPH associated with nocturia    3. Iron deficiency anemia due to chronic blood loss    4. Need for hepatitis C screening test        Plan:       Gonzalez was seen today for anorexia and fatigue.    Diagnoses and all orders for this visit:    Orthostasis  -     Cortisol; Future    BPH associated with nocturia  -     finasteride (PROSCAR) 5 mg tablet; Take 1 tablet (5 mg total) by mouth once daily.    Iron deficiency anemia due to chronic blood loss  -     CBC auto  differential; Future    Need for hepatitis C screening test  -     Hepatitis C antibody; Future      Follow-up if symptoms worsen or fail to improve.

## 2018-05-30 LAB — HCV AB SERPL QL IA: NEGATIVE

## 2018-06-05 ENCOUNTER — TELEPHONE (OUTPATIENT)
Dept: INTERNAL MEDICINE | Facility: CLINIC | Age: 63
End: 2018-06-05

## 2018-06-05 NOTE — TELEPHONE ENCOUNTER
----- Message from Radha Mauricio sent at 6/5/2018  2:38 PM CDT -----  Contact: [phone]/pt's wife  Patient's wife  requesting to speak with you regarding getting pt's test results. Please advise.

## 2018-06-12 ENCOUNTER — TELEPHONE (OUTPATIENT)
Dept: UROLOGY | Facility: CLINIC | Age: 63
End: 2018-06-12

## 2018-06-12 NOTE — TELEPHONE ENCOUNTER
nathanieli- received phone call from [facility] concerning his catheter supplies. Patient was seen on 3.23.2018 and order was written to [facility] four times a day. Samples were given and rx was faxed. Patient received one free box of samples from [facility] and then never returned the phone calls. [facility] has left several messages and has not had any return phones calls concerning co-payment and ordering supplies. Patient was scheduled for a SUDS on 4.19.2018 and a prostate biopsy on 5/3/2018. Patient cancelled both procedures and commented that they are receiving care elsewhere.

## 2018-07-12 DIAGNOSIS — N40.1 BPH ASSOCIATED WITH NOCTURIA: ICD-10-CM

## 2018-07-12 DIAGNOSIS — R35.1 BPH ASSOCIATED WITH NOCTURIA: ICD-10-CM

## 2018-07-12 RX ORDER — FINASTERIDE 5 MG/1
5 TABLET, FILM COATED ORAL DAILY
Qty: 90 TABLET | Refills: 3 | OUTPATIENT
Start: 2018-07-12 | End: 2019-07-12

## 2018-07-18 PROBLEM — R33.9 URINARY RETENTION: Status: ACTIVE | Noted: 2018-07-18

## 2018-07-19 ENCOUNTER — TELEPHONE (OUTPATIENT)
Dept: UROLOGY | Facility: CLINIC | Age: 63
End: 2018-07-19

## 2018-07-19 NOTE — TELEPHONE ENCOUNTER
----- Message from Alessia Shi sent at 7/19/2018  4:56 PM CDT -----  Contact: PT'S WIFE            Name of Who is Calling: Kristina      What is the request in detail: requesting a call back in regards to appt being scheduled that was discussed. Please call pt and advise      Can the clinic reply by [facility]: yes      What Number to Call Back if not in [facility]: [phone]

## 2018-07-19 NOTE — TELEPHONE ENCOUNTER
Spoke with pts wife Kristina whom called into office in regarding the UROLIFT procedure. Per Kristina she isn't sure how her schedule looks. She and the pt will schedule an appointment via [facility].

## 2018-07-19 NOTE — TELEPHONE ENCOUNTER
----- Message from Karin Mendez sent at 7/19/2018 11:03 AM CDT -----  Name of Who is Calling: Kristina      What is the request in detail: Pt would like to speak with staff about UROLIFT procedure      Can the clinic reply by [facility]:  No       What Number to Call Back if not in [facility]:[phone]

## 2018-07-25 ENCOUNTER — OFFICE VISIT (OUTPATIENT)
Dept: UROLOGY | Facility: CLINIC | Age: 63
End: 2018-07-25
Attending: UROLOGY
Payer: MEDICARE

## 2018-07-25 VITALS
WEIGHT: 195 LBS | DIASTOLIC BLOOD PRESSURE: 68 MMHG | HEIGHT: 66 IN | HEART RATE: 89 BPM | BODY MASS INDEX: 31.34 KG/M2 | SYSTOLIC BLOOD PRESSURE: 105 MMHG

## 2018-07-25 DIAGNOSIS — N40.1 BENIGN NON-NODULAR PROSTATIC HYPERPLASIA WITH LOWER URINARY TRACT SYMPTOMS: Primary | ICD-10-CM

## 2018-07-25 DIAGNOSIS — R33.9 URINARY RETENTION: ICD-10-CM

## 2018-07-25 PROCEDURE — 87088 URINE BACTERIA CULTURE: CPT

## 2018-07-25 PROCEDURE — 99215 OFFICE O/P EST HI 40 MIN: CPT | Mod: 25,S$GLB,, | Performed by: UROLOGY

## 2018-07-25 PROCEDURE — 51798 US URINE CAPACITY MEASURE: CPT | Mod: S$GLB,,, | Performed by: UROLOGY

## 2018-07-25 PROCEDURE — 87086 URINE CULTURE/COLONY COUNT: CPT

## 2018-07-25 PROCEDURE — 81002 URINALYSIS NONAUTO W/O SCOPE: CPT | Mod: S$GLB,,, | Performed by: UROLOGY

## 2018-07-25 RX ORDER — CIPROFLOXACIN 2 MG/ML
400 INJECTION, SOLUTION INTRAVENOUS
Status: CANCELLED | OUTPATIENT
Start: 2018-07-25

## 2018-07-25 NOTE — PROGRESS NOTES
"Subjective:      Gonzalez Cuevas is a 62 y.o. male who was self-referred for evaluation of BPH and urinary retention.      He has seen Dr. Young and Dr. Mallory in the past. Most recently, he had cysto and clot/evacuation by Dr. Hilario ([facility]) on 7/18. His cysto note indicates that he has a very large median lobe. He removed catheter at home yesterday.     Urinary retention was episodic starting 3 years ago but more or less constant since February this year. He has been performing CIC as needed (about once per day) since February.    His AUASS is 35/6. He is on proscar but just started a few days ago. Previously unable to tolerate flomax due to hypotension.    He had cysto by Dr. Young earlier this month (prior to hematuria and clot retention). That report notes trilobar hypertrophy with large IV median lobe. He recommended patient have TURP.    The following portions of the patient's history were reviewed and updated as appropriate: allergies, current medications, past family history, past medical history, past social history, past surgical history and problem list.    Review of Systems  Constitutional: no fever or chills  ENT: no nasal congestion or sore throat  Respiratory: no cough or shortness of breath  Cardiovascular: no chest pain or palpitations  Gastrointestinal: no nausea or vomiting, tolerating diet  Genitourinary: as per HPI  Hematologic/Lymphatic: no easy bruising or lymphadenopathy  Musculoskeletal: no arthralgias or myalgias  Neurological: no seizures or tremors  Behavioral/Psych: no auditory or visual hallucinations     Objective:   Vitals: /68 (BP Location: Left arm, Patient Position: Sitting, BP Method: Large (Automatic))   Pulse 89   Ht 5' 6" (1.676 m)   Wt 88.5 kg (195 lb)   BMI 31.47 kg/m²     Physical Exam   General: alert and oriented, no acute distress  Head: normocephalic, atraumatic  Neck: supple, no lymphadenopathy, normal ROM, no masses  Respiratory: " Symmetric expansion, non-labored breathing  Skin: normal coloration and turgor, no rashes, no suspicious skin lesions noted  Neuro: alert and oriented x3, no gross deficits  Psych: normal judgment and insight, normal mood/affect and non-anxious    Bladder Scan PVR: 386cc      Lab Review   Urinalysis demonstrates positive for leukocytes, red blood cells  Lab Results   Component Value Date    WBC 6.40 07/18/2018    HGB 10.5 (L) 07/18/2018    HCT 31.2 (L) 07/18/2018    MCV 88 07/18/2018     07/18/2018     Lab Results   Component Value Date    CREATININE 3.60 (H) 07/18/2018    BUN 30 (H) 07/18/2018     Lab Results   Component Value Date    PSA 1.6 11/12/2009     Assessment:     1. Benign non-nodular prostatic hyperplasia with lower urinary tract symptoms    2. Urinary retention        Plan:   We had a very long discussion regarding his current condition and the treatment options. Both recent cysto reports describe a very large intravesical median lobe, which I explained to him cannot be effectively treated with Urolift and also often responds poorly to medical treatment. I agree with his previous urologist that TURP is likely the best procedure for him, and I explained that I don't believe it matters if this is a laser or electrocautery procedure. We reviewed all the risks involved, including the risk of retrograde ejaculation. He indicated that he would like to proceed with TURP as soon as possible. Will schedule for 8/7/2018 at [facility] [place].    I spent over 40 minutes with the patient. Over 50% of the visit was spent in counseling and coordination of care.

## 2018-07-26 ENCOUNTER — TELEPHONE (OUTPATIENT)
Dept: UROLOGY | Facility: CLINIC | Age: 63
End: 2018-07-26

## 2018-07-26 NOTE — TELEPHONE ENCOUNTER
----- Message from Drake Mcnamara MD sent at 7/26/2018  9:00 AM CDT -----  Contact: wife Kristina Cuevas   This patient has been seen by multiple urologists (varsha gayle larsen at least).  I have reviewed his chart  He saw Dr Santillan yesterday and is booked for surgery on August 7. TURP.  It would appear on review of his record he is looking to do a urolift.  I agree with all previous physicians that he is not a candidate and I would not perform a urolift either, as dr santillan told him. He should proceed as planned with Dr santillan    ----- Message -----  From: Linda Ospina  Sent: 7/26/2018   8:29 AM  To: Drake Mcnamara MD    Patient wife states they need to come in for a second opinion from previous urologist   Patient wife states the patient previous urologist can not perform the procedure patient need    Did not disclose what procedure    Epic request scheduling with previous urologist wife decline    Wife want patient to be seen today if possible ..and if not sometime soon     Please call to advice [phone] (home)

## 2018-07-27 LAB — BACTERIA UR CULT: NORMAL

## 2018-07-30 ENCOUNTER — TELEPHONE (OUTPATIENT)
Dept: UROLOGY | Facility: CLINIC | Age: 63
End: 2018-07-30

## 2018-07-30 DIAGNOSIS — N30.00 ACUTE CYSTITIS WITHOUT HEMATURIA: Primary | ICD-10-CM

## 2018-07-30 RX ORDER — SULFAMETHOXAZOLE AND TRIMETHOPRIM 800; 160 MG/1; MG/1
1 TABLET ORAL 2 TIMES DAILY
Qty: 20 TABLET | Refills: 0 | Status: SHIPPED | OUTPATIENT
Start: 2018-07-30 | End: 2018-08-09

## 2018-07-30 NOTE — TELEPHONE ENCOUNTER
Pt notified of + urine culture. Instructed to begin bactrim bid until surgery. Pt and his wife verbalized understanding.

## 2018-08-02 LAB
BILIRUB SERPL-MCNC: ABNORMAL MG/DL
BLOOD URINE, POC: 250
COLOR, POC UA: YELLOW
GLUCOSE UR QL STRIP: ABNORMAL
KETONES UR QL STRIP: ABNORMAL
LEUKOCYTE ESTERASE URINE, POC: ABNORMAL
NITRITE, POC UA: ABNORMAL
PH, POC UA: 8
POC RESIDUAL URINE VOLUME: 386 ML (ref 0–100)
PROTEIN, POC: 100
SPECIFIC GRAVITY, POC UA: 1
UROBILINOGEN, POC UA: ABNORMAL

## 2018-08-03 ENCOUNTER — ANESTHESIA EVENT (OUTPATIENT)
Dept: SURGERY | Facility: OTHER | Age: 63
End: 2018-08-03
Payer: MEDICARE

## 2018-08-03 ENCOUNTER — HOSPITAL ENCOUNTER (OUTPATIENT)
Dept: PREADMISSION TESTING | Facility: OTHER | Age: 63
Discharge: HOME | End: 2018-08-03
Attending: UROLOGY
Payer: MEDICARE

## 2018-08-03 VITALS
HEART RATE: 90 BPM | OXYGEN SATURATION: 98 % | SYSTOLIC BLOOD PRESSURE: 101 MMHG | HEIGHT: 66 IN | DIASTOLIC BLOOD PRESSURE: 59 MMHG | TEMPERATURE: 97 F | BODY MASS INDEX: 29.73 KG/M2 | WEIGHT: 185 LBS

## 2018-08-03 RX ORDER — FAMOTIDINE 20 MG/1
20 TABLET, FILM COATED ORAL
Status: CANCELLED | OUTPATIENT
Start: 2018-08-03 | End: 2018-08-03

## 2018-08-03 RX ORDER — LIDOCAINE HYDROCHLORIDE 10 MG/ML
0.5 INJECTION, SOLUTION EPIDURAL; INFILTRATION; INTRACAUDAL; PERINEURAL ONCE
Status: CANCELLED | OUTPATIENT
Start: 2018-08-03 | End: 2018-08-03

## 2018-08-03 RX ORDER — OXYCODONE HYDROCHLORIDE 5 MG/1
5 TABLET ORAL ONCE AS NEEDED
Status: CANCELLED | OUTPATIENT
Start: 2018-08-03 | End: 2018-08-03

## 2018-08-03 RX ORDER — SODIUM CHLORIDE, SODIUM LACTATE, POTASSIUM CHLORIDE, CALCIUM CHLORIDE 600; 310; 30; 20 MG/100ML; MG/100ML; MG/100ML; MG/100ML
INJECTION, SOLUTION INTRAVENOUS CONTINUOUS
Status: CANCELLED | OUTPATIENT
Start: 2018-08-03

## 2018-08-03 NOTE — DISCHARGE INSTRUCTIONS
PRE-ADMIT TESTING -  [phone]    [address]  [place] [facility]          Your surgery has been scheduled at [facility]. We are pleased to have the opportunity to serve you. For Further Information please call [phone].    On the day of surgery please report to the Information Desk on the 1st floor.    · CONTACT YOUR PHYSICIAN'S OFFICE THE DAY PRIOR TO YOUR SURGERY TO OBTAIN YOUR ARRIVAL TIME.     · The evening before surgery do not eat anything after 9 p.m. ( this includes hard candy, chewing gum and mints).  You may only have GATORADE, POWERADE AND WATER  from 9 p.m. until you leave your home.   DO NOT DRINK ANY LIQUIDS ON THE WAY TO THE HOSPITAL.      SPECIAL MEDICATION INSTRUCTIONS: TAKE medications checked off by the Anesthesiologist on your Medication List.    Angiogram Patients: Take medications as instructed by your physician, including aspirin.     Surgery Patients:    If you take ASPIRIN - Your PHYSICIAN/SURGEON will need to inform you IF/OR when you need to stop taking aspirin prior to your surgery.     Do Not take any medications containing IBUPROFEN.  Do Not Wear any make-up or dark nail polish   (especially eye make-up) to surgery. If you come to surgery with makeup on you will be required to remove the makeup or nail polish.    Do not shave your surgical area at least 5 days prior to your surgery. The surgical prep will be performed at the hospital according to Infection Control regulations.    Leave all valuables at home.   Do Not wear any jewelry or watches, including any metal in body piercings.  Contact Lens must be removed before surgery. Either do not wear the contact lens or bring a case and solution for storage.  Please bring a container for eyeglasses or dentures as required.  Bring any paperwork your physician has provided, such as consent forms,  history and physicals, doctor's orders, etc.   Bring comfortable clothes that are loose fitting to wear upon  discharge. Take into consideration the type of surgery being performed.  Maintain your diet as advised per your physician the day prior to surgery.      Adequate rest the night before surgery is advised.   Park in the Parking lot behind the hospital or in the [facility] across the street from the parking lot. Parking is complimentary.  If you will be discharged the same day as your procedure, please arrange for a responsible adult to drive you home or to accompany you if traveling by taxi.   YOU WILL NOT BE PERMITTED TO DRIVE OR TO LEAVE THE HOSPITAL ALONE AFTER SURGERY.   It is strongly recommended that you arrange for someone to remain with you for the first 24 hrs following your surgery.       Thank you for your cooperation.  The Staff of [facility].        Bathing Instructions                                                                 Please shower the evening before and morning of your procedure with    ANTIBACTERIAL SOAP. ( DIAL, etc )  Concentrate on the surgical area   for at least 3 minutes and rinse completely. Dry off as usual.   Do not use any deodorant, powder, body lotions, perfume, after shave or    cologne.

## 2018-08-03 NOTE — ANESTHESIA PREPROCEDURE EVALUATION
08/03/2018  Gonzalez Cuevas is a 62 y.o., male.    Anesthesia Evaluation    I have reviewed the Patient Summary Reports.    I have reviewed the Nursing Notes.   I have reviewed the Medications.     Review of Systems  Anesthesia Hx:  No problems with previous Anesthesia    Social:  Non-Smoker, No Alcohol Use    Hematology/Oncology:     Oncology Normal    -- Anemia:   EENT/Dental:   [place]   Cardiovascular:  Cardiovascular Normal Exercise tolerance: good     Pulmonary:   Shortness of breath    Renal/:   BPH    Hepatic/GI:   PUD,    Musculoskeletal:   Arthritis   Spine Disorders: lumbar Degenerative disease    Neurological:  Neurology Normal Vertigo.   Psych:  Psychiatric Normal           Physical Exam  General:  Well nourished    Airway/Jaw/Neck:  Airway Findings: Mouth Opening: Normal Tongue: Normal  General Airway Assessment: Adult, Good  Mallampati: I  Jaw/Neck Findings:  Neck ROM: Normal ROM      Dental:  Dental Findings: In tact   Chest/Lungs:  Chest/Lungs Findings: Clear to auscultation, Normal Respiratory Rate     Heart/Vascular:  Heart Findings: Rate: Normal  Rhythm: Regular Rhythm  Sounds: Normal        Mental Status:  Mental Status Findings:  Cooperative, Alert and Oriented         Anesthesia Plan  Type of Anesthesia, risks & benefits discussed:  Anesthesia Type:  general  Patient's Preference:   Intra-op Monitoring Plan: standard ASA monitors  Intra-op Monitoring Plan Comments:   Post Op Pain Control Plan: per primary service following discharge from PACU  Post Op Pain Control Plan Comments:   Induction:   IV  Beta Blocker:  Patient is not currently on a Beta-Blocker (No further documentation required).       Informed Consent: Patient understands risks and agrees with Anesthesia plan.  Questions answered. Anesthesia consent signed with patient.  ASA Score: 2     Day of Surgery Review of History &  Physical:    H&P update referred to the surgeon.     Anesthesia Plan Notes: Labs and EKG are okay. Pt is anemic.        Ready For Surgery From Anesthesia Perspective.

## 2018-08-06 ENCOUNTER — TELEPHONE (OUTPATIENT)
Dept: UROLOGY | Facility: CLINIC | Age: 63
End: 2018-08-06

## 2018-08-06 NOTE — TELEPHONE ENCOUNTER
----- Message from Betty Talavera sent at 8/6/2018 11:47 AM CDT -----  Contact: Kristina Cuevas (Spouse)            Name of Who is Calling: #Kristina Cuevas (Spouse)      What is the request in detail: #Pt's spouse is requesting the surgery time for tomorrow. Pt's spouse was advised the clinical team will call with the confirmed time and pt's spouse insisted on speaking to someone.      Can the clinic reply by [facility]:       What Number to Call Back if not in [facility]: [phone]

## 2018-08-07 ENCOUNTER — SURGERY (OUTPATIENT)
Age: 63
End: 2018-08-07

## 2018-08-07 ENCOUNTER — HOSPITAL ENCOUNTER (OUTPATIENT)
Facility: OTHER | Age: 63
Discharge: HOME | End: 2018-08-08
Attending: UROLOGY | Admitting: UROLOGY
Payer: MEDICARE

## 2018-08-07 ENCOUNTER — ANESTHESIA (OUTPATIENT)
Dept: SURGERY | Facility: OTHER | Age: 63
End: 2018-08-07
Payer: MEDICARE

## 2018-08-07 DIAGNOSIS — R97.20 ELEVATED PSA: ICD-10-CM

## 2018-08-07 DIAGNOSIS — N40.1 BENIGN NON-NODULAR PROSTATIC HYPERPLASIA WITH LOWER URINARY TRACT SYMPTOMS: ICD-10-CM

## 2018-08-07 PROCEDURE — 63600175 PHARM REV CODE 636 W HCPCS: Performed by: NURSE ANESTHETIST, CERTIFIED REGISTERED

## 2018-08-07 PROCEDURE — 25000003 PHARM REV CODE 250: Performed by: SPECIALIST

## 2018-08-07 PROCEDURE — 37000008 HC ANESTHESIA 1ST 15 MINUTES: Performed by: UROLOGY

## 2018-08-07 PROCEDURE — 94761 N-INVAS EAR/PLS OXIMETRY MLT: CPT

## 2018-08-07 PROCEDURE — 63600175 PHARM REV CODE 636 W HCPCS: Performed by: UROLOGY

## 2018-08-07 PROCEDURE — 25000003 PHARM REV CODE 250: Performed by: ANESTHESIOLOGY

## 2018-08-07 PROCEDURE — 71000039 HC RECOVERY, EACH ADD'L HOUR: Performed by: UROLOGY

## 2018-08-07 PROCEDURE — 25000003 PHARM REV CODE 250: Performed by: NURSE ANESTHETIST, CERTIFIED REGISTERED

## 2018-08-07 PROCEDURE — 52601 PROSTATECTOMY (TURP): CPT | Mod: ,,, | Performed by: UROLOGY

## 2018-08-07 PROCEDURE — 27201423 OPTIME MED/SURG SUP & DEVICES STERILE SUPPLY: Performed by: UROLOGY

## 2018-08-07 PROCEDURE — 88305 TISSUE EXAM BY PATHOLOGIST: CPT | Mod: 26,,, | Performed by: PATHOLOGY

## 2018-08-07 PROCEDURE — 36000706: Performed by: UROLOGY

## 2018-08-07 PROCEDURE — 37000009 HC ANESTHESIA EA ADD 15 MINS: Performed by: UROLOGY

## 2018-08-07 PROCEDURE — 36000707: Performed by: UROLOGY

## 2018-08-07 PROCEDURE — 25000003 PHARM REV CODE 250: Performed by: UROLOGY

## 2018-08-07 PROCEDURE — 88305 TISSUE EXAM BY PATHOLOGIST: CPT | Performed by: PATHOLOGY

## 2018-08-07 PROCEDURE — 71000033 HC RECOVERY, INTIAL HOUR: Performed by: UROLOGY

## 2018-08-07 RX ORDER — HYDROMORPHONE HYDROCHLORIDE 2 MG/ML
0.4 INJECTION, SOLUTION INTRAMUSCULAR; INTRAVENOUS; SUBCUTANEOUS EVERY 5 MIN PRN
Status: DISCONTINUED | OUTPATIENT
Start: 2018-08-07 | End: 2018-08-07 | Stop reason: HOSPADM

## 2018-08-07 RX ORDER — CIPROFLOXACIN 2 MG/ML
400 INJECTION, SOLUTION INTRAVENOUS
Status: COMPLETED | OUTPATIENT
Start: 2018-08-07 | End: 2018-08-07

## 2018-08-07 RX ORDER — HYDROCODONE BITARTRATE AND ACETAMINOPHEN 5; 325 MG/1; MG/1
1 TABLET ORAL EVERY 4 HOURS PRN
Status: DISCONTINUED | OUTPATIENT
Start: 2018-08-07 | End: 2018-08-08 | Stop reason: HOSPADM

## 2018-08-07 RX ORDER — FENTANYL CITRATE 50 UG/ML
INJECTION, SOLUTION INTRAMUSCULAR; INTRAVENOUS
Status: DISCONTINUED | OUTPATIENT
Start: 2018-08-07 | End: 2018-08-07

## 2018-08-07 RX ORDER — PHENAZOPYRIDINE HYDROCHLORIDE 100 MG/1
200 TABLET, FILM COATED ORAL EVERY 8 HOURS PRN
Qty: 20 TABLET | Refills: 0 | Status: SHIPPED | OUTPATIENT
Start: 2018-08-07 | End: 2018-12-10

## 2018-08-07 RX ORDER — ACETAMINOPHEN 325 MG/1
650 TABLET ORAL EVERY 4 HOURS PRN
Status: DISCONTINUED | OUTPATIENT
Start: 2018-08-07 | End: 2018-08-08 | Stop reason: HOSPADM

## 2018-08-07 RX ORDER — OXYCODONE HYDROCHLORIDE 5 MG/1
5 TABLET ORAL ONCE AS NEEDED
Status: DISCONTINUED | OUTPATIENT
Start: 2018-08-07 | End: 2018-08-07 | Stop reason: HOSPADM

## 2018-08-07 RX ORDER — OXYCODONE HYDROCHLORIDE 5 MG/1
5 TABLET ORAL
Status: DISCONTINUED | OUTPATIENT
Start: 2018-08-07 | End: 2018-08-07 | Stop reason: HOSPADM

## 2018-08-07 RX ORDER — MEPERIDINE HYDROCHLORIDE 50 MG/ML
12.5 INJECTION INTRAMUSCULAR; INTRAVENOUS; SUBCUTANEOUS ONCE AS NEEDED
Status: DISCONTINUED | OUTPATIENT
Start: 2018-08-07 | End: 2018-08-07 | Stop reason: HOSPADM

## 2018-08-07 RX ORDER — FENTANYL CITRATE 50 UG/ML
25 INJECTION, SOLUTION INTRAMUSCULAR; INTRAVENOUS EVERY 5 MIN PRN
Status: DISCONTINUED | OUTPATIENT
Start: 2018-08-07 | End: 2018-08-07 | Stop reason: HOSPADM

## 2018-08-07 RX ORDER — SODIUM CHLORIDE 0.9 % (FLUSH) 0.9 %
3 SYRINGE (ML) INJECTION
Status: DISCONTINUED | OUTPATIENT
Start: 2018-08-07 | End: 2018-08-08 | Stop reason: HOSPADM

## 2018-08-07 RX ORDER — PROPOFOL 10 MG/ML
VIAL (ML) INTRAVENOUS
Status: DISCONTINUED | OUTPATIENT
Start: 2018-08-07 | End: 2018-08-07

## 2018-08-07 RX ORDER — HYDROCODONE BITARTRATE AND ACETAMINOPHEN 5; 325 MG/1; MG/1
1 TABLET ORAL EVERY 6 HOURS PRN
Qty: 12 TABLET | Refills: 0 | Status: SHIPPED | OUTPATIENT
Start: 2018-08-07 | End: 2019-06-10

## 2018-08-07 RX ORDER — FAMOTIDINE 20 MG/1
20 TABLET, FILM COATED ORAL
Status: COMPLETED | OUTPATIENT
Start: 2018-08-07 | End: 2018-08-07

## 2018-08-07 RX ORDER — ONDANSETRON 2 MG/ML
4 INJECTION INTRAMUSCULAR; INTRAVENOUS EVERY 12 HOURS PRN
Status: DISCONTINUED | OUTPATIENT
Start: 2018-08-07 | End: 2018-08-08 | Stop reason: HOSPADM

## 2018-08-07 RX ORDER — LIDOCAINE HYDROCHLORIDE 10 MG/ML
0.5 INJECTION, SOLUTION EPIDURAL; INFILTRATION; INTRACAUDAL; PERINEURAL ONCE
Status: DISCONTINUED | OUTPATIENT
Start: 2018-08-07 | End: 2018-08-07 | Stop reason: HOSPADM

## 2018-08-07 RX ORDER — SODIUM CHLORIDE, SODIUM LACTATE, POTASSIUM CHLORIDE, CALCIUM CHLORIDE 600; 310; 30; 20 MG/100ML; MG/100ML; MG/100ML; MG/100ML
INJECTION, SOLUTION INTRAVENOUS CONTINUOUS
Status: DISCONTINUED | OUTPATIENT
Start: 2018-08-07 | End: 2018-08-07

## 2018-08-07 RX ORDER — ONDANSETRON 2 MG/ML
4 INJECTION INTRAMUSCULAR; INTRAVENOUS DAILY PRN
Status: DISCONTINUED | OUTPATIENT
Start: 2018-08-07 | End: 2018-08-07 | Stop reason: HOSPADM

## 2018-08-07 RX ORDER — MIDAZOLAM HYDROCHLORIDE 1 MG/ML
INJECTION INTRAMUSCULAR; INTRAVENOUS
Status: DISCONTINUED | OUTPATIENT
Start: 2018-08-07 | End: 2018-08-07

## 2018-08-07 RX ORDER — ONDANSETRON 2 MG/ML
INJECTION INTRAMUSCULAR; INTRAVENOUS
Status: DISCONTINUED | OUTPATIENT
Start: 2018-08-07 | End: 2018-08-07

## 2018-08-07 RX ORDER — EPHEDRINE SULFATE 50 MG/ML
INJECTION, SOLUTION INTRAVENOUS
Status: DISCONTINUED | OUTPATIENT
Start: 2018-08-07 | End: 2018-08-07

## 2018-08-07 RX ORDER — LIDOCAINE HCL/PF 100 MG/5ML
SYRINGE (ML) INTRAVENOUS
Status: DISCONTINUED | OUTPATIENT
Start: 2018-08-07 | End: 2018-08-07

## 2018-08-07 RX ORDER — LIDOCAINE HYDROCHLORIDE 10 MG/ML
10 INJECTION INFILTRATION; PERINEURAL ONCE
Status: SHIPPED | OUTPATIENT
Start: 2018-08-07

## 2018-08-07 RX ADMIN — MIDAZOLAM HYDROCHLORIDE 2 MG: 1 INJECTION, SOLUTION INTRAMUSCULAR; INTRAVENOUS at 01:08

## 2018-08-07 RX ADMIN — FENTANYL CITRATE 100 MCG: 50 INJECTION, SOLUTION INTRAMUSCULAR; INTRAVENOUS at 01:08

## 2018-08-07 RX ADMIN — CIPROFLOXACIN 400 MG: 2 INJECTION, SOLUTION INTRAVENOUS at 01:08

## 2018-08-07 RX ADMIN — PROPOFOL 200 MG: 10 INJECTION, EMULSION INTRAVENOUS at 01:08

## 2018-08-07 RX ADMIN — ONDANSETRON 4 MG: 2 INJECTION INTRAMUSCULAR; INTRAVENOUS at 02:08

## 2018-08-07 RX ADMIN — PROPOFOL 200 MG: 10 INJECTION, EMULSION INTRAVENOUS at 03:08

## 2018-08-07 RX ADMIN — HYDROCODONE BITARTRATE AND ACETAMINOPHEN 1 TABLET: 5; 325 TABLET ORAL at 10:08

## 2018-08-07 RX ADMIN — EPHEDRINE SULFATE 20 MG: 50 INJECTION INTRAMUSCULAR; INTRAVENOUS; SUBCUTANEOUS at 01:08

## 2018-08-07 RX ADMIN — FAMOTIDINE 20 MG: 20 TABLET ORAL at 11:08

## 2018-08-07 RX ADMIN — SODIUM CHLORIDE, SODIUM LACTATE, POTASSIUM CHLORIDE, AND CALCIUM CHLORIDE: 600; 310; 30; 20 INJECTION, SOLUTION INTRAVENOUS at 01:08

## 2018-08-07 RX ADMIN — OXYCODONE HYDROCHLORIDE 5 MG: 5 TABLET ORAL at 04:08

## 2018-08-07 RX ADMIN — CARBOXYMETHYLCELLULOSE SODIUM 2 DROP: 2.5 SOLUTION/ DROPS OPHTHALMIC at 01:08

## 2018-08-07 RX ADMIN — LIDOCAINE HYDROCHLORIDE 75 MG: 20 INJECTION, SOLUTION INTRAVENOUS at 01:08

## 2018-08-07 NOTE — OP NOTE
Operative Note       Surgery Date: 8/7/2018     Surgeon: Nehemias Macias MD    Assistant Surgeon: None    Pre-op Diagnosis:  Benign non-nodular prostatic hyperplasia with lower urinary tract symptoms [N40.1]    Post-op Diagnosis: Post-Op Diagnosis Codes:     * Benign non-nodular prostatic hyperplasia with lower urinary tract symptoms [N40.1]    Procedures:  Procedure(s) (LRB):  PROSTATECTOMY-TRANSURETHRAL (N/A)    Anesthesia: Choice    Indications for Procedure:  The patient is a 62 y.o. year old male with BPH and urinary retention.  He presents today for surgical treatment with TURP.  The full risks and benefits of the procedure have been explained and detail and he wishes to proceed.    Procedure Description:  The patient was brought to the operative suite and placed under Choice anesthesia. He was placed in lithotomy position and prepped and draped in usual sterile fashion.  Time out was performed prior to starting the procedure.    Rigid cystoscopy was performed using the 26-Nigerien resectoscope. The prostatic urethra again demonstrated trilobar hypertrophy with large intravesical median lobe. The bladder was noted for moderate trabeculations. The ureteral orifices were identified and noted to be normal distance from the bladder neck bilaterally. Care was taken throughout the procedure to ensure that these were not involved in the resection.     Using the bipolar loop electrode, the median lobe was resected down to the level of the prostatic capsule. The lateral lobes were then resected in similar fashion using the loop and button electrodes. Care was taken to ensure that the resection did not extend distally beyond the verumontanum.  Once the resection had been completed, there was a patent prostatic urethra from the verumontanum to the bladder neck. Electrocautery was used to achieve adequate hemostasis.     Once full hemostasis had been achieved, the resectoscope was removed and a 22-Nigerien 3-way Chang catheter  was placed with 30 mL sterile water instilled into the balloon. This was connected to continuous bladder irrigation with clear output. The patient was then awoken however before he left the OR his urine output turned thick bright red despite full rate CBI. The graves was placed on traction, however hematuria persisted for several minutes. I therefore opted to repeat cystoscopy and perform additional cauterization.    He was again placed under GETA and prepped and draped. The 26-Pakistani resectoscope was introduced. No obvious bleeding noted but a moderate amount of blood clot was visualized. This clot was evacuated and scraped off the prostate with the loop. One bleeding vessel was noted on right aspect of prostate, which was cauterized. A small amount of additional resection was performed as well. Once full hemostasis was confirmed, the scope was removed and the graevs replaced. The graves was connected to CBI with clear output confirmed.    He was awoken and transferred to the PACU in stable condition.     Complications: No    Estimated Blood Loss: 0cc         Specimens Removed:   Specimen (12h ago through future)    Start     Ordered    08/07/18 1452  Specimen to Pathology - Surgery  Once     Comments:  1. PROSTATE CHIPS      08/07/18 1452          Implants: * No implants in log *           Disposition: PACU - hemodynamically stable.           Condition: Good

## 2018-08-07 NOTE — TRANSFER OF CARE
"Anesthesia Transfer of Care Note    Patient: [facility]    Procedure(s) Performed: Procedure(s) (LRB):  PROSTATECTOMY-TRANSURETHRAL (N/A)    Patient location: PACU    Anesthesia Type: general    Transport from OR: Transported from OR on room air with adequate spontaneous ventilation    Post pain: adequate analgesia    Post assessment: no apparent anesthetic complications    Post vital signs: stable    Level of consciousness: responds to stimulation    Nausea/Vomiting: no nausea/vomiting    Complications: none    Transfer of care protocol was followed      Last vitals:   Visit Vitals  /61 (BP Location: Left arm, Patient Position: Lying)   Pulse 66   Temp 36.5 °C (97.7 °F) (Oral)   Resp 16   Ht 5' 6" (1.676 m)   Wt 83.9 kg (185 lb)   SpO2 100%   BMI 29.86 kg/m²     "

## 2018-08-07 NOTE — BRIEF OP NOTE
[facility]  Brief Operative Note     SUMMARY     Surgery Date: 8/7/2018     Surgeon(s) and Role:     * Frankie Macias MD - Primary    Assisting Surgeon: None    Pre-op Diagnosis:  Benign non-nodular prostatic hyperplasia with lower urinary tract symptoms [N40.1]    Post-op Diagnosis:  Post-Op Diagnosis Codes:     * Benign non-nodular prostatic hyperplasia with lower urinary tract symptoms [N40.1]    Procedure(s) (LRB):  PROSTATECTOMY-TRANSURETHRAL (N/A)    Anesthesia: Choice    Description of the findings of the procedure: Large intravesical median lobe. Resected with bipolar loop and button. After case initially completed, urine turned bright thick red consistent with prostatic bleeding. After GETA, cystoscope repeated. Additional resection and cautery completed. Chang replaced with clear output.    Estimated Blood Loss: 10cc         Specimens:   Specimen (12h ago through future)    Start     Ordered    08/07/18 1452  Specimen to Pathology - Surgery  Once     Comments:  1. PROSTATE CHIPS      08/07/18 1452          Discharge Note    SUMMARY     Admit Date: 8/7/2018    Discharge Date and Time: 8/7/2018    Hospital Course: Patient was admitted for elective outpatient procedure, which was uncomplicated and well tolerated.      Final Diagnosis: Post-Op Diagnosis Codes:     * Benign non-nodular prostatic hyperplasia with lower urinary tract symptoms [N40.1]    Disposition: Home or Self Care    Follow Up/Patient Instructions:     Medications:  Reconciled Home Medications: Current Discharge Medication List      START taking these medications    Details   phenazopyridine (PYRIDIUM) 100 MG tablet Take 2 tablets (200 mg total) by mouth every 8 (eight) hours as needed (burning with urination).  Qty: 20 tablet, Refills: 0         CONTINUE these medications which have NOT CHANGED    Details   finasteride (PROSCAR) 5 mg tablet Take 1 tablet (5 mg total) by mouth once daily.  Qty: 90 tablet, Refills: 3     Associated Diagnoses: BPH associated with nocturia      sulfamethoxazole-trimethoprim 800-160mg (BACTRIM DS) 800-160 mg Tab Take 1 tablet by mouth 2 (two) times daily. for 10 days  Qty: 20 tablet, Refills: 0    Associated Diagnoses: Acute cystitis without hematuria      triamcinolone acetonide 0.1% (KENALOG) 0.1 % cream Apply topically 2 (two) times daily. For rash  Qty: 80 g, Refills: 1             Discharge Procedure Orders  Diet general       Follow-up Information     Frankie Macias MD In 1 week.    Specialty:  Urology  Why:  For post-operative follow-up  Contact information:  [address]  [facility] [id number] [phone]

## 2018-08-07 NOTE — CARE UPDATE
Bedside report received from Zoila PRECIADO from recovery. Pt transferred to room 371. Pt AAO x 4. Resp. Even and unlabored. 22F/C patent and draining pink urine to g/u bag. Pt c/o pain @ 3 stating having soreness @ this time. Oriented to room. Spouse @ bedside. VSS. Accepting clear liquids well. Diet advanced to regular. SCD's in place bilateral. Safety maintained. Call light in reach.

## 2018-08-07 NOTE — H&P (VIEW-ONLY)
"Subjective:      Gonzalez Cuevas is a 62 y.o. male who was self-referred for evaluation of BPH and urinary retention.      He has seen Dr. Young and Dr. Mallory in the past. Most recently, he had cysto and clot/evacuation by Dr. Hilario ([facility]) on 7/18. His cysto note indicates that he has a very large median lobe. He removed catheter at home yesterday.     Urinary retention was episodic starting 3 years ago but more or less constant since February this year. He has been performing CIC as needed (about once per day) since February.    His AUASS is 35/6. He is on proscar but just started a few days ago. Previously unable to tolerate flomax due to hypotension.    He had cysto by Dr. Young earlier this month (prior to hematuria and clot retention). That report notes trilobar hypertrophy with large IV median lobe. He recommended patient have TURP.    The following portions of the patient's history were reviewed and updated as appropriate: allergies, current medications, past family history, past medical history, past social history, past surgical history and problem list.    Review of Systems  Constitutional: no fever or chills  ENT: no nasal congestion or sore throat  Respiratory: no cough or shortness of breath  Cardiovascular: no chest pain or palpitations  Gastrointestinal: no nausea or vomiting, tolerating diet  Genitourinary: as per HPI  Hematologic/Lymphatic: no easy bruising or lymphadenopathy  Musculoskeletal: no arthralgias or myalgias  Neurological: no seizures or tremors  Behavioral/Psych: no auditory or visual hallucinations     Objective:   Vitals: /68 (BP Location: Left arm, Patient Position: Sitting, BP Method: Large (Automatic))   Pulse 89   Ht 5' 6" (1.676 m)   Wt 88.5 kg (195 lb)   BMI 31.47 kg/m²     Physical Exam   General: alert and oriented, no acute distress  Head: normocephalic, atraumatic  Neck: supple, no lymphadenopathy, normal ROM, no masses  Respiratory: " Symmetric expansion, non-labored breathing  Skin: normal coloration and turgor, no rashes, no suspicious skin lesions noted  Neuro: alert and oriented x3, no gross deficits  Psych: normal judgment and insight, normal mood/affect and non-anxious    Bladder Scan PVR: 386cc      Lab Review   Urinalysis demonstrates positive for leukocytes, red blood cells  Lab Results   Component Value Date    WBC 6.40 07/18/2018    HGB 10.5 (L) 07/18/2018    HCT 31.2 (L) 07/18/2018    MCV 88 07/18/2018     07/18/2018     Lab Results   Component Value Date    CREATININE 3.60 (H) 07/18/2018    BUN 30 (H) 07/18/2018     Lab Results   Component Value Date    PSA 1.6 11/12/2009     Assessment:     1. Benign non-nodular prostatic hyperplasia with lower urinary tract symptoms    2. Urinary retention        Plan:   We had a very long discussion regarding his current condition and the treatment options. Both recent cysto reports describe a very large intravesical median lobe, which I explained to him cannot be effectively treated with Urolift and also often responds poorly to medical treatment. I agree with his previous urologist that TURP is likely the best procedure for him, and I explained that I don't believe it matters if this is a laser or electrocautery procedure. We reviewed all the risks involved, including the risk of retrograde ejaculation. He indicated that he would like to proceed with TURP as soon as possible. Will schedule for 8/7/2018 at [facility].    I spent over 40 minutes with the patient. Over 50% of the visit was spent in counseling and coordination of care.

## 2018-08-08 ENCOUNTER — TELEPHONE (OUTPATIENT)
Dept: UROLOGY | Facility: CLINIC | Age: 63
End: 2018-08-08

## 2018-08-08 VITALS
DIASTOLIC BLOOD PRESSURE: 67 MMHG | HEART RATE: 68 BPM | WEIGHT: 185 LBS | HEIGHT: 66 IN | OXYGEN SATURATION: 100 % | SYSTOLIC BLOOD PRESSURE: 108 MMHG | RESPIRATION RATE: 18 BRPM | BODY MASS INDEX: 29.73 KG/M2 | TEMPERATURE: 97 F

## 2018-08-08 PROCEDURE — 97161 PT EVAL LOW COMPLEX 20 MIN: CPT

## 2018-08-08 PROCEDURE — G8980 MOBILITY D/C STATUS: HCPCS | Mod: CH

## 2018-08-08 PROCEDURE — G8978 MOBILITY CURRENT STATUS: HCPCS | Mod: CH

## 2018-08-08 PROCEDURE — G8979 MOBILITY GOAL STATUS: HCPCS | Mod: CH

## 2018-08-08 PROCEDURE — 94761 N-INVAS EAR/PLS OXIMETRY MLT: CPT

## 2018-08-08 PROCEDURE — 25000003 PHARM REV CODE 250: Performed by: UROLOGY

## 2018-08-08 PROCEDURE — 99024 POSTOP FOLLOW-UP VISIT: CPT | Mod: ,,, | Performed by: NURSE PRACTITIONER

## 2018-08-08 RX ORDER — DOCUSATE SODIUM 100 MG/1
100 CAPSULE, LIQUID FILLED ORAL DAILY
Status: DISCONTINUED | OUTPATIENT
Start: 2018-08-08 | End: 2018-08-08 | Stop reason: SDUPTHER

## 2018-08-08 RX ORDER — DOCUSATE SODIUM 100 MG/1
100 CAPSULE, LIQUID FILLED ORAL 2 TIMES DAILY
Status: DISCONTINUED | OUTPATIENT
Start: 2018-08-08 | End: 2018-08-08 | Stop reason: HOSPADM

## 2018-08-08 RX ADMIN — HYDROCODONE BITARTRATE AND ACETAMINOPHEN 1 TABLET: 5; 325 TABLET ORAL at 01:08

## 2018-08-08 RX ADMIN — HYDROCODONE BITARTRATE AND ACETAMINOPHEN 1 TABLET: 5; 325 TABLET ORAL at 03:08

## 2018-08-08 NOTE — TELEPHONE ENCOUNTER
Spoke with pts wife. She states that she had some concerns with nursing staff in hospital. She was advised to contact their charge nurse. Pt is now scheduled for Monday 8/13th at 10:45 as requested.

## 2018-08-08 NOTE — PLAN OF CARE
Problem: Physical Therapy Goal  Goal: Physical Therapy Goal  Outcome: Ongoing (interventions implemented as appropriate)  PT evaluation completed. Pt is independent with functional mobility observed during session despite mild weakness and sensory impairment in L foot/toes. Distribution is 2nd-4th toes of dorsal aspect of foot and entire plantar surface of foot. No further acute PT needs at this time. Suspect some type of neuropraxia post-op which should resolve. Discussed signs/symptoms with patient and spouse.

## 2018-08-08 NOTE — TELEPHONE ENCOUNTER
----- Message from Fanny Vallejo NP sent at 8/8/2018  2:14 PM CDT -----  Please schedule post op follow up with Dr. Macias for Monday or Tuesday. Discharged today. Thanks!

## 2018-08-08 NOTE — DISCHARGE SUMMARY
[facility]  Urology  Discharge Summary      Patient Name: Gonzalez Cuevas  MRN: [id number]  Admission Date: 8/7/2018  Hospital Length of Stay: 0 days  Discharge Date and Time:  08/08/2018 1:30 PM  Attending Physician: Frankie Macias MD   Discharging Provider: Fanny Vallejo NP  Primary Care Physician: Anthony Macias MD    HPI:     Procedure(s) (LRB):  PROSTATECTOMY-TRANSURETHRAL (N/A)     Indwelling Lines/Drains at time of discharge:   Lines/Drains/Airways     Drain                 Urethral Catheter 08/07/18 1453 Latex;Triple-lumen;Straight-tip 22 Fr. less than 1 day                Hospital Course (synopsis of major diagnoses, care, treatment, and services provided during the course of the hospital stay):     Post Op Note:     POD #1 s/p TURP with Dr. Macias      VSS, afebrile  Abdomen soft and non distended; +flatus and BS  Pain well controlled with PO medications  Tolerating diet well, no N/V  CBI low with clear, thin pink/red urine (no clots noted)  Has not ambulated  Reports paresthesia to his right toes since surgery     Plan:  PT evaluation of paresthesia  Ambulate   Wean CBI to off  Will reevaluate later today and likely D/C     Addendum:  Pt seen an examined at 1300     CBI has been clamped since this am. Urine remains thin dark pink, no clots.   Paresthesia has improved, has not been seen by PT. Per PT, they will not be able to evaluate the patient until tomorrow. Discussed with the pt and wife. They prefer to be discharged today. If symptoms are not resolved will follow up with PCP.  Ambulating without difficulty     Plan: Discussed with Dr. Macias  D/C home WITH graves in place  Follow up Monday or Tuesday for VT    Consults:     Significant Diagnostic Studies:     Pending Diagnostic Studies:     None          Final Active Diagnoses:    Diagnosis Date Noted POA    PRINCIPAL PROBLEM:  Benign non-nodular prostatic hyperplasia with lower urinary tract symptoms [N40.1] 08/07/2018  Yes      Problems Resolved During this Admission:    Diagnosis Date Noted Date Resolved POA       Discharged Condition: good    Disposition: Home or Self Care    Follow Up:  Follow-up Information     Frankie Macias MD In 1 week.    Specialty:  Urology  Why:  For post-operative follow-up  Contact information:  [address]  [facility] [address]  [phone]                 Patient Instructions:     Diet general     Lifting restrictions   Order Comments: Do not lift >5lbs x 4 weeks     Notify your health care provider if you experience any of the following:  persistent nausea and vomiting or diarrhea     Notify your health care provider if you experience any of the following:  severe uncontrolled pain     Notify your health care provider if you experience any of the following:  redness, tenderness, or signs of infection (pain, swelling, redness, odor or green/yellow discharge around incision site)     Notify your health care provider if you experience any of the following:  difficulty breathing or increased cough     Notify your health care provider if you experience any of the following:  severe persistent headache     Notify your health care provider if you experience any of the following:  worsening rash     Notify your health care provider if you experience any of the following:  persistent dizziness, light-headedness, or visual disturbances     Notify your health care provider if you experience any of the following:  increased confusion or weakness       Medications:  Reconciled Home Medications:      Medication List      START taking these medications    HYDROcodone-acetaminophen 5-325 mg per tablet  Commonly known as:  NORCO  Take 1 tablet by mouth every 6 (six) hours as needed for Pain.     phenazopyridine 100 MG tablet  Commonly known as:  PYRIDIUM  Take 2 tablets (200 mg total) by mouth every 8 (eight) hours as needed (burning with urination).        CONTINUE taking these medications     finasteride 5 mg tablet  Commonly known as:  PROSCAR  Take 1 tablet (5 mg total) by mouth once daily.     sulfamethoxazole-trimethoprim 800-160mg 800-160 mg Tab  Commonly known as:  BACTRIM DS  Take 1 tablet by mouth 2 (two) times daily. for 10 days     triamcinolone acetonide 0.1% 0.1 % cream  Commonly known as:  KENALOG  Apply topically 2 (two) times daily. For rash            Time spent on the discharge of patient: 30 minutes    Fanny Vallejo NP  Urology  [facility]

## 2018-08-08 NOTE — ANESTHESIA POSTPROCEDURE EVALUATION
"Anesthesia Post Evaluation    Patient: [facility]    Procedure(s) Performed: Procedure(s) (LRB):  PROSTATECTOMY-TRANSURETHRAL (N/A)    Final Anesthesia Type: general  Patient location during evaluation: PACU  Patient participation: Yes- Able to Participate  Level of consciousness: awake and alert and oriented  Post-procedure vital signs: reviewed and stable  Pain management: adequate  Airway patency: patent  PONV status at discharge: No PONV  Anesthetic complications: no      Cardiovascular status: blood pressure returned to baseline and hemodynamically stable  Respiratory status: unassisted, spontaneous ventilation and room air  Hydration status: euvolemic  Follow-up not needed.        Visit Vitals  /67 (BP Location: Right arm, Patient Position: Lying)   Pulse 68   Temp 36.3 °C (97.3 °F) (Oral)   Resp 18   Ht 5' 6" (1.676 m)   Wt 83.9 kg (185 lb)   SpO2 99%   BMI 29.86 kg/m²       Pain/Kati Score: Pain Assessment Performed: Yes (8/8/2018 12:00 AM)  Presence of Pain: denies (8/8/2018 12:00 AM)  Pain Rating Prior to Med Admin: 7 (8/8/2018  1:41 AM)  Pain Rating Post Med Admin: 3 (8/8/2018  2:41 AM)  Kati Score: 10 (8/7/2018  5:35 PM)      "

## 2018-08-08 NOTE — PT/OT/SLP EVAL
"Physical Therapy Evaluation, Treatment and Discharge Summary    Patient Name:  Gonzalez Cuevas   MRN:  [id number]    Recommendations:     Discharge Recommendations:  home   Discharge Equipment Recommendations: none   Barriers to discharge: None    Assessment:     Gonzalez Cuevas is a 62 y.o. male admitted with a medical diagnosis of Benign non-nodular prostatic hyperplasia with lower urinary tract symptoms.  He presents with the following impairments/functional limitations:  impaired sensation, weakness. PT evaluation completed. Pt is independent with functional mobility observed during session despite mild weakness and sensory impairment in L foot/toes. Distribution is 2nd-4th toes of dorsal aspect of foot and entire plantar surface of foot. No further acute PT needs at this time. Suspect some type of neuropraxia post-op which should resolve. Discussed signs/symptoms with patient and spouse. No further acute PT services.    Recent Surgery: Procedure(s) (LRB):  PROSTATECTOMY-TRANSURETHRAL (N/A) 1 Day Post-Op    Plan:     · During this hospitalization, please re-consult PT if patient has a change in status.  · Plan of Care Expires:  09/07/18   Plan of Care Reviewed with: patient, spouse    Subjective     Communicated with nurse prior to session.  Patient found supine in bed with HOB elevated with spouse present upon PT entry to room, agreeable to evaluation.      Chief Complaint: numbness in R foot  Patient comments/goals: "Should I elevate my feet?" Spouse present and reported, "I'm not so concerned about the foot. I'm concerned about the drainage." (re: Chang catheter drainage at insertion site)  Pain/Comfort:  · Pain Rating 1: 0/10  · Pain Rating Post-Intervention 1: 0/10    Patients cultural, spiritual, Temple conflicts given the current situation: none specified    Living Environment:  Pt lives with spouse in 1 story house with 0 steps to enter.   Prior to admission, patients level of function was " "independent with no AD for ambulation and ADLs. Spouse reports pt has had a Graves catheter in the past and has had infections.  Patient has the following equipment: none.  Upon discharge, patient will have assistance from spouse.    Objective:     Patient found with: graves catheter     General Precautions: Standard,     Orthopedic Precautions:N/A   Braces: N/A     Exams:  Cognition: Patient is oriented to Person, Place, Time and Situation and follows approximately 100% of one step commands.    Posture:    -       Rounded shoulders  Sensation: Intact to light touch with eyes closed at bilateral feet, however reports paresthesias in L foot dorsal aspect of toes 2-4 and entire plantar surface of foot.   Cardiopulmonary: Feet symmetrical in color, temperature, cap refill. 2+ R dorsal pedis pulse. No tenderness throughout lower leg, calf, foot, ankle. No edema bilateral feet.  Skin Integrity: Visible skin intact, mild red drainage noted at Graves catheter insertion, mild drainage onto gown  Coordination: No coordination impairments identified with functional mobility. No formal testing performed.   LE ROM/Strength: 5/5 bilateral hip flexion, bilateral knee extension, bilateral ankle dorsiflexion, L great toe extension, L 2-5 toe extension. 4/5 R great toe extension and R 2-4 toe extension.  Tone: No tone impairments identified during functional mobility.   Vital signs: SpO2: 99% on room air      Functional Mobility:  Bed Mobility:     Supine to Sit: did not occur  Sit to Supine: did not occur  Transfers:     Sit to Stand:  independence with no AD.   Gait: x 25 ft on level tile with no AD and independently. Pt stated, "I don't have any problems with the walking." Noted grossly symmetrical step length and good foot clearance. Pt managing Graves catheter independently.   Balance: independent with no AD    AM-PAC 6 CLICK MOBILITY  Total Score:24       Therapeutic Activities and Exercises:   Discussed role of PT, signs/red " flags to see MD (including asymmetrical swelling, color change, worsening paresthesias, worsening weakness).       Patient left sitting up at edge of bed with all lines intact, call button in reach, charge nurse notified that spouse was requesting to speak to her notified and spouse present.    GOALS:    Physical Therapy Goals        Problem: Physical Therapy Goal    Goal Priority Disciplines Outcome Goal Variances Interventions   Physical Therapy Goal     PT/OT, PT Ongoing (interventions implemented as appropriate)                     History:     Past Medical History:   Diagnosis Date    Bleeding stomach ulcer 1992,1994    DDD (degenerative disc disease), lumbar     Encounter for blood transfusion     Hearing difficulty     Retinal tear of right eye 2015    with floaters    Syncope and collapse     4x since 2005    Ulcer     Vertigo     Vertigo        Past Surgical History:   Procedure Laterality Date    BLADDER FULGURATION N/A 7/18/2018    Procedure: FULGURATION, BLADDER NECK;  Surgeon: Lexx Hilario MD;  Location: [facility] OR;  Service: Urology;  Laterality: N/A;    BLADDER SURGERY      blockage age 10-11    CYSTOSCOPY N/A 7/18/2018    Procedure: CYSTOSCOPY;  Surgeon: Lexx Hilario MD;  Location: [facility] OR;  Service: Urology;  Laterality: N/A;    LIPOMA RESECTION  2016    posterior neck    REMOVAL OF BLOOD CLOT N/A 7/18/2018    Procedure: REMOVAL, BLOOD CLOT;  Surgeon: Lexx Hilario MD;  Location: [facility] OR;  Service: Urology;  Laterality: N/A;    TRANSURETHRAL RESECTION OF PROSTATE N/A 8/7/2018    Procedure: PROSTATECTOMY-TRANSURETHRAL;  Surgeon: Frankie Macias MD;  Location: [facility] OR;  Service: Urology;  Laterality: N/A;       Clinical Decision Making:     History  Co-morbidities and personal factors that may impact the plan of care Examination  Body Structures and Functions, activity limitations and participation restrictions that may impact the plan of care Clinical Presentation    Decision Making/ Complexity Score   Co-morbidities:   [] Time since onset of injury / illness / exacerbation  [] Status of current condition  []Patient's cognitive status and safety concerns    [] Multiple Medical Problems (see med hx)  Personal Factors:   [] Patient's age  [] Prior Level of function   [] Patient's home situation (environment and family support)  [] Patient's level of motivation  [] Expected progression of patient      HISTORY:(criteria)    [] 18624 - no personal factors/history    [] 82324 - has 1-2 personal factor/comorbidity     [] 07055 - has >3 personal factor/comorbidity     Body Regions:  [] Objective examination findings  [] Head     []  Neck  [] Trunk   [] Upper Extremity  [] Lower Extremity    Body Systems:  [] For communication ability, affect, cognition, language, and learning style: the assessment of the ability to make needs known, consciousness, orientation (person, place, and time), expected emotional /behavioral responses, and learning preferences (eg, learning barriers, education  needs)  [] For the neuromuscular system: a general assessment of gross coordinated movement (eg, balance, gait, locomotion, transfers, and transitions) and motor function  (motor control and motor learning)  [] For the musculoskeletal system: the assessment of gross symmetry, gross range of motion, gross strength, height, and weight  [] For the integumentary system: the assessment of pliability(texture), presence of scar formation, skin color, and skin integrity  [] For cardiovascular/pulmonary system: the assessment of heart rate, respiratory rate, blood pressure, and edema     Activity limitations:    [] Patient's cognitive status and saf ety concerns          [] Status of current condition      [] Weight bearing restriction  [] Cardiopulmunary Restriction    Participation Restrictions:   [] Goals and goal agreement with the patient     [] Rehab potential (prognosis) and probable outcome       Examination of Body System: (criteria)    [] 07572 - addressing 1-2 elements    [] 53366 - addressing a total of 3 or more elements     [] 92086 -  Addressing a total of 4 or more elements         Clinical Presentation: (criteria)  Choose one     On examination of body system using standardized tests and measures patient presents with (CHOOSE ONE) elements from any of the following: body structures and functions, activity limitations, and/or participation restrictions.  Leading to a clinical presentation that is considered (CHOOSE ONE)                              Clinical Decision Making  (Eval Complexity):  Choose One     Time Tracking:     PT Received On: 08/08/18  PT Start Time: 1420     PT Stop Time: 1456  PT Total Time (min): 36 min     Billable Minutes: Evaluation 35      Pati Panda, PT  08/08/2018

## 2018-08-08 NOTE — PROGRESS NOTES
Post Op Note:    POD #1 s/p TURP with Dr. Macias     VSS, afebrile  Abdomen soft and non distended; +flatus and BS  Pain well controlled with PO medications  Tolerating diet well, no N/V  CBI low with clear, thin pink/red urine (no clots noted)  Has not ambulated  Reports paresthesia to his right toes since surgery    Plan:  PT evaluation of paresthesia  Ambulate   Wean CBI to off  Will reevaluate later today and likely D/C    Addendum:  Pt seen an examined at 1300    CBI has been clamped since this am. Urine remains thin dark pink, no clots.   Paresthesia has improved, has not been seen by PT. Per PT, they will not be able to evaluate the patient until tomorrow. Discussed with the pt and wife. They prefer to be discharged today. If symptoms are not resolved will follow up with PCP.  Ambulating without difficulty    Plan: Discussed with Dr. Macias  D/C home WITH graves in place  Follow up Monday or Tuesday for VT

## 2018-08-08 NOTE — TELEPHONE ENCOUNTER
----- Message from Ginger Ruano sent at 8/8/2018 11:33 AM CDT -----  Contact: nurse [facility]   Name of Who is Calling: Nurse Riley from [facility]     What is the request in detail: Rosemary states patient wife is requesting to stop bladder irrigation......Please contact to further discuss and advise       Can the clinic reply by [facility]: No    What Number to Call Back if not in [facility]: [phone]

## 2018-08-08 NOTE — PLAN OF CARE
No needs from CM perspective.       08/08/18 1447   Final Note   Assessment Type Final Discharge Note   Discharge Disposition Home   What phone number can be called within the next 1-3 days to see how you are doing after discharge? [phone]   Hospital Follow Up  Appt(s) scheduled? Yes   Discharge plans and expectations educations in teach back method with documentation complete? Yes   Right Care Referral Info   Post Acute Recommendation No Care

## 2018-08-13 ENCOUNTER — OFFICE VISIT (OUTPATIENT)
Dept: UROLOGY | Facility: CLINIC | Age: 63
End: 2018-08-13
Attending: UROLOGY
Payer: MEDICARE

## 2018-08-13 VITALS
WEIGHT: 185 LBS | HEART RATE: 80 BPM | SYSTOLIC BLOOD PRESSURE: 103 MMHG | HEIGHT: 66 IN | BODY MASS INDEX: 29.73 KG/M2 | DIASTOLIC BLOOD PRESSURE: 69 MMHG

## 2018-08-13 DIAGNOSIS — N40.1 BPH ASSOCIATED WITH NOCTURIA: Primary | ICD-10-CM

## 2018-08-13 DIAGNOSIS — R35.1 BPH ASSOCIATED WITH NOCTURIA: Primary | ICD-10-CM

## 2018-08-13 PROCEDURE — 99024 POSTOP FOLLOW-UP VISIT: CPT | Mod: S$GLB,POP,, | Performed by: UROLOGY

## 2018-08-13 RX ORDER — DUTASTERIDE 0.5 MG/1
0.5 CAPSULE, LIQUID FILLED ORAL
COMMUNITY
Start: 2018-06-21 | End: 2018-12-10

## 2018-08-13 RX ORDER — GABAPENTIN 100 MG/1
CAPSULE ORAL
COMMUNITY
Start: 2018-04-22 | End: 2019-06-10

## 2018-08-13 RX ORDER — FINASTERIDE 5 MG/1
5 TABLET, FILM COATED ORAL
COMMUNITY
Start: 2018-06-12 | End: 2018-12-10

## 2018-08-13 NOTE — PROGRESS NOTES
"Subjective:       Gonzalez Cuevas is a 62 y.o. male status post TURP on 8/7/2018 here for post-op follow-up. Graves remains in place d/t h/o retention.     Objective:   Vitals: /69 (BP Location: Left arm, Patient Position: Sitting, BP Method: Medium (Automatic))   Pulse 80   Ht 5' 6" (1.676 m)   Wt 83.9 kg (185 lb)   BMI 29.86 kg/m²      Physical Exam   General:  alert, appears stated age and cooperative   : Graves in place with clear yellow urine      Voiding Trial  Voiding Trial (Fill/Pull/Void): 100cc of sterile saline was instilled into the bladder through the previously placed graves catheter.  The graves balloon was then deflated and the graves removed.  The patient subsequently voided 100cc, consistent with successful voiding trial.  The graves was not replaced.     Pathology  Prostate Chips: NEM    Assessment and Plan:   1 week s/p TURP, doing well.    FU 1 mos        "

## 2018-09-10 ENCOUNTER — OFFICE VISIT (OUTPATIENT)
Dept: UROLOGY | Facility: CLINIC | Age: 63
End: 2018-09-10
Attending: UROLOGY
Payer: MEDICARE

## 2018-09-10 VITALS
BODY MASS INDEX: 29.73 KG/M2 | WEIGHT: 185 LBS | HEIGHT: 66 IN | DIASTOLIC BLOOD PRESSURE: 72 MMHG | SYSTOLIC BLOOD PRESSURE: 112 MMHG | HEART RATE: 77 BPM

## 2018-09-10 DIAGNOSIS — N40.1 BENIGN NON-NODULAR PROSTATIC HYPERPLASIA WITH LOWER URINARY TRACT SYMPTOMS: Primary | ICD-10-CM

## 2018-09-10 PROCEDURE — 51798 US URINE CAPACITY MEASURE: CPT | Mod: S$GLB,,, | Performed by: UROLOGY

## 2018-09-10 PROCEDURE — 81002 URINALYSIS NONAUTO W/O SCOPE: CPT | Mod: S$GLB,,, | Performed by: UROLOGY

## 2018-09-10 PROCEDURE — 99024 POSTOP FOLLOW-UP VISIT: CPT | Mod: S$GLB,POP,, | Performed by: UROLOGY

## 2018-09-10 NOTE — PROGRESS NOTES
"Subjective:       Gonzalez Cuevas is a 62 y.o. male status post TURP on 8/7/2018 here for post-op follow-up.     Passed voiding trial 1 week post-op. He had retention prior to surgery.     AUASS today is 5/1.    Objective:   Vitals: /72 (BP Location: Left arm, Patient Position: Sitting, BP Method: Large (Automatic))   Pulse 77   Ht 5' 6" (1.676 m)   Wt 83.9 kg (185 lb)   BMI 29.86 kg/m²      Physical Exam   General:  alert, appears stated age and cooperative     Bladder Scan PVR: 73acc      Assessment and Plan:   1 month s/p TURP, doing well.        "

## 2018-09-11 LAB
BILIRUB SERPL-MCNC: NORMAL MG/DL
BLOOD URINE, POC: 250
COLOR, POC UA: YELLOW
GLUCOSE UR QL STRIP: NORMAL
KETONES UR QL STRIP: NORMAL
LEUKOCYTE ESTERASE URINE, POC: NORMAL
NITRITE, POC UA: NORMAL
PH, POC UA: 5
POC RESIDUAL URINE VOLUME: 73 ML (ref 0–100)
PROTEIN, POC: 100
SPECIFIC GRAVITY, POC UA: 1.02
UROBILINOGEN, POC UA: NORMAL

## 2018-12-10 ENCOUNTER — OFFICE VISIT (OUTPATIENT)
Dept: UROLOGY | Facility: CLINIC | Age: 63
End: 2018-12-10
Attending: UROLOGY
Payer: MEDICARE

## 2018-12-10 VITALS
WEIGHT: 185 LBS | DIASTOLIC BLOOD PRESSURE: 82 MMHG | HEIGHT: 66 IN | SYSTOLIC BLOOD PRESSURE: 120 MMHG | BODY MASS INDEX: 29.73 KG/M2 | HEART RATE: 72 BPM

## 2018-12-10 DIAGNOSIS — N40.1 BENIGN NON-NODULAR PROSTATIC HYPERPLASIA WITH LOWER URINARY TRACT SYMPTOMS: Primary | ICD-10-CM

## 2018-12-10 PROCEDURE — 99213 OFFICE O/P EST LOW 20 MIN: CPT | Mod: 25,S$GLB,ICN, | Performed by: UROLOGY

## 2018-12-10 PROCEDURE — 81002 URINALYSIS NONAUTO W/O SCOPE: CPT | Mod: S$GLB,,, | Performed by: UROLOGY

## 2018-12-10 NOTE — PROGRESS NOTES
"Subjective:      Gonzalez Cuevas is a 62 y.o. male who returns today regarding his BPH.    He is s/p TURP 8/7/18. Passed VT 1 week post-op; had been in retention prior to surgery.    Today he reports significant improvement and no c/o.    His AUASS is 3/0.    He has stopped his BPH medications.    The following portions of the patient's history were reviewed and updated as appropriate: allergies, current medications, past family history, past medical history, past social history, past surgical history and problem list.    Review of Systems  A comprehensive multipoint review of systems was negative except as otherwise stated in the HPI.     Objective:   Vitals: /82 (BP Location: Left arm, Patient Position: Sitting, BP Method: Large (Automatic))   Pulse 72   Ht 5' 6" (1.676 m)   Wt 83.9 kg (185 lb)   BMI 29.86 kg/m²     Physical Exam   General: alert and oriented, no acute distress  Respiratory: Symmetric expansion, non-labored breathing  Neuro: no gross deficits  Psych: normal judgment and insight, normal mood/affect and non-anxious    Bladder Scan PVR: 49cc      Lab Review     Lab Results   Component Value Date    WBC 6.40 07/18/2018    HGB 10.5 (L) 07/18/2018    HCT 31.2 (L) 07/18/2018    MCV 88 07/18/2018     07/18/2018     Lab Results   Component Value Date    CREATININE 3.60 (H) 07/18/2018    BUN 30 (H) 07/18/2018     Lab Results   Component Value Date    PSA 1.6 11/12/2009    PSADIAG 4.0 03/23/2018       Assessment and Plan:   1. Benign non-nodular prostatic hyperplasia with lower urinary tract symptoms  -- Doing well s/p TURP  -- FU 6 mos     "

## 2018-12-11 LAB
BILIRUB SERPL-MCNC: NORMAL MG/DL
BLOOD URINE, POC: NORMAL
COLOR, POC UA: YELLOW
GLUCOSE UR QL STRIP: NORMAL
KETONES UR QL STRIP: NORMAL
LEUKOCYTE ESTERASE URINE, POC: NORMAL
NITRITE, POC UA: NORMAL
PH, POC UA: 6
PROTEIN, POC: NORMAL
SPECIFIC GRAVITY, POC UA: 1.01
UROBILINOGEN, POC UA: NORMAL

## 2019-06-10 ENCOUNTER — OFFICE VISIT (OUTPATIENT)
Dept: UROLOGY | Facility: CLINIC | Age: 64
End: 2019-06-10
Attending: UROLOGY
Payer: MEDICARE

## 2019-06-10 VITALS
WEIGHT: 185 LBS | DIASTOLIC BLOOD PRESSURE: 84 MMHG | HEART RATE: 62 BPM | BODY MASS INDEX: 29.73 KG/M2 | HEIGHT: 66 IN | SYSTOLIC BLOOD PRESSURE: 127 MMHG

## 2019-06-10 DIAGNOSIS — N40.1 BENIGN NON-NODULAR PROSTATIC HYPERPLASIA WITH LOWER URINARY TRACT SYMPTOMS: Primary | ICD-10-CM

## 2019-06-10 LAB
BILIRUB SERPL-MCNC: ABNORMAL MG/DL
BLOOD URINE, POC: ABNORMAL
COLOR, POC UA: YELLOW
GLUCOSE UR QL STRIP: ABNORMAL
KETONES UR QL STRIP: ABNORMAL
LEUKOCYTE ESTERASE URINE, POC: ABNORMAL
NITRITE, POC UA: ABNORMAL
PH, POC UA: 6
POC RESIDUAL URINE VOLUME: 49 ML (ref 0–100)
PROTEIN, POC: ABNORMAL
SPECIFIC GRAVITY, POC UA: 1.01
UROBILINOGEN, POC UA: ABNORMAL

## 2019-06-10 PROCEDURE — 99213 PR OFFICE/OUTPT VISIT, EST, LEVL III, 20-29 MIN: ICD-10-PCS | Mod: 25,S$GLB,, | Performed by: UROLOGY

## 2019-06-10 PROCEDURE — 51798 US URINE CAPACITY MEASURE: CPT | Mod: S$GLB,,, | Performed by: UROLOGY

## 2019-06-10 PROCEDURE — 99213 OFFICE O/P EST LOW 20 MIN: CPT | Mod: 25,S$GLB,, | Performed by: UROLOGY

## 2019-06-10 PROCEDURE — 81002 URINALYSIS NONAUTO W/O SCOPE: CPT | Mod: S$GLB,,, | Performed by: UROLOGY

## 2019-06-10 PROCEDURE — 51798 POCT BLADDER SCAN: ICD-10-PCS | Mod: S$GLB,,, | Performed by: UROLOGY

## 2019-06-10 PROCEDURE — 81002 POCT URINE DIPSTICK WITHOUT MICROSCOPE: ICD-10-PCS | Mod: S$GLB,,, | Performed by: UROLOGY

## 2019-06-10 NOTE — PROGRESS NOTES
"Subjective:      Gonzalez Cuevas is a 63 y.o. male who returns today regarding his BPH.    He is s/p TURP 8/7/18. He had been in retention prior to surgery.    Today he reports sustained significant improvement and no c/o.    His AUASS is 1/0.    He has stopped his BPH medications.    The following portions of the patient's history were reviewed and updated as appropriate: allergies, current medications, past family history, past medical history, past social history, past surgical history and problem list.    Review of Systems  A comprehensive multipoint review of systems was negative except as otherwise stated in the HPI.     Objective:   Vitals: /84 (BP Location: Left arm, Patient Position: Sitting, BP Method: Large (Automatic))   Pulse 62   Ht 5' 6" (1.676 m)   Wt 83.9 kg (185 lb)   BMI 29.86 kg/m²     Physical Exam   General: alert and oriented, no acute distress  Respiratory: Symmetric expansion, non-labored breathing  Neuro: no gross deficits  Psych: normal judgment and insight, normal mood/affect and non-anxious    Bladder Scan PVR: 49cc      Lab Review     Lab Results   Component Value Date    WBC 6.40 07/18/2018    HGB 10.5 (L) 07/18/2018    HCT 31.2 (L) 07/18/2018    MCV 88 07/18/2018     07/18/2018     Lab Results   Component Value Date    CREATININE 3.60 (H) 07/18/2018    BUN 30 (H) 07/18/2018     Lab Results   Component Value Date    PSA 1.6 11/12/2009    PSADIAG 4.0 03/23/2018       Assessment and Plan:   1. Benign non-nodular prostatic hyperplasia with lower urinary tract symptoms  -- Doing well s/p TURP  -- Due for PSA  -- FU 12 mos     "

## 2019-08-31 ENCOUNTER — EXTERNAL CHRONIC CARE MANAGEMENT (OUTPATIENT)
Dept: PRIMARY CARE CLINIC | Facility: CLINIC | Age: 64
End: 2019-08-31
Payer: MEDICARE

## 2019-08-31 PROCEDURE — 99490 CHRNC CARE MGMT STAFF 1ST 20: CPT | Mod: PBBFAC,PN | Performed by: INTERNAL MEDICINE

## 2019-08-31 PROCEDURE — 99490 PR CHRONIC CARE MGMT, 1ST 20 MIN: ICD-10-PCS | Mod: S$PBB,,, | Performed by: INTERNAL MEDICINE

## 2019-08-31 PROCEDURE — 99490 CHRNC CARE MGMT STAFF 1ST 20: CPT | Mod: S$PBB,,, | Performed by: INTERNAL MEDICINE

## 2019-09-12 ENCOUNTER — PATIENT OUTREACH (OUTPATIENT)
Dept: ADMINISTRATIVE | Facility: HOSPITAL | Age: 64
End: 2019-09-12

## 2019-09-30 ENCOUNTER — EXTERNAL CHRONIC CARE MANAGEMENT (OUTPATIENT)
Dept: PRIMARY CARE CLINIC | Facility: CLINIC | Age: 64
End: 2019-09-30
Payer: MEDICARE

## 2019-09-30 PROCEDURE — 99490 PR CHRONIC CARE MGMT, 1ST 20 MIN: ICD-10-PCS | Mod: S$PBB,,, | Performed by: INTERNAL MEDICINE

## 2019-09-30 PROCEDURE — 99490 CHRNC CARE MGMT STAFF 1ST 20: CPT | Mod: PBBFAC,PN | Performed by: INTERNAL MEDICINE

## 2019-09-30 PROCEDURE — 99490 CHRNC CARE MGMT STAFF 1ST 20: CPT | Mod: S$PBB,,, | Performed by: INTERNAL MEDICINE

## 2019-10-31 ENCOUNTER — EXTERNAL CHRONIC CARE MANAGEMENT (OUTPATIENT)
Dept: PRIMARY CARE CLINIC | Facility: CLINIC | Age: 64
End: 2019-10-31
Payer: MEDICARE

## 2019-10-31 PROCEDURE — 99490 CHRNC CARE MGMT STAFF 1ST 20: CPT | Mod: PBBFAC,PN | Performed by: INTERNAL MEDICINE

## 2019-10-31 PROCEDURE — 99490 PR CHRONIC CARE MGMT, 1ST 20 MIN: ICD-10-PCS | Mod: S$PBB,,, | Performed by: INTERNAL MEDICINE

## 2019-10-31 PROCEDURE — 99490 CHRNC CARE MGMT STAFF 1ST 20: CPT | Mod: S$PBB,,, | Performed by: INTERNAL MEDICINE

## 2019-11-30 ENCOUNTER — EXTERNAL CHRONIC CARE MANAGEMENT (OUTPATIENT)
Dept: PRIMARY CARE CLINIC | Facility: CLINIC | Age: 64
End: 2019-11-30
Payer: MEDICARE

## 2019-11-30 PROCEDURE — 99490 PR CHRONIC CARE MGMT, 1ST 20 MIN: ICD-10-PCS | Mod: S$PBB,,, | Performed by: INTERNAL MEDICINE

## 2019-11-30 PROCEDURE — 99490 CHRNC CARE MGMT STAFF 1ST 20: CPT | Mod: S$PBB,,, | Performed by: INTERNAL MEDICINE

## 2019-11-30 PROCEDURE — 99490 CHRNC CARE MGMT STAFF 1ST 20: CPT | Mod: PBBFAC,PN | Performed by: INTERNAL MEDICINE

## 2019-12-31 ENCOUNTER — EXTERNAL CHRONIC CARE MANAGEMENT (OUTPATIENT)
Dept: PRIMARY CARE CLINIC | Facility: CLINIC | Age: 64
End: 2019-12-31
Payer: MEDICARE

## 2019-12-31 PROCEDURE — 99490 PR CHRONIC CARE MGMT, 1ST 20 MIN: ICD-10-PCS | Mod: S$PBB,,, | Performed by: INTERNAL MEDICINE

## 2019-12-31 PROCEDURE — 99490 CHRNC CARE MGMT STAFF 1ST 20: CPT | Mod: PBBFAC,PN | Performed by: INTERNAL MEDICINE

## 2019-12-31 PROCEDURE — 99490 CHRNC CARE MGMT STAFF 1ST 20: CPT | Mod: S$PBB,,, | Performed by: INTERNAL MEDICINE

## 2020-12-15 ENCOUNTER — TELEPHONE (OUTPATIENT)
Dept: UROLOGY | Facility: CLINIC | Age: 65
End: 2020-12-15

## 2020-12-15 NOTE — TELEPHONE ENCOUNTER
----- Message from Nate Yanez sent at 12/15/2020 12:03 PM CST -----  Regarding: patient advice  Pt would like a call back regarding pain in abdomen and a rash.    Pt can be reached at [phone]

## 2020-12-31 ENCOUNTER — NURSE TRIAGE (OUTPATIENT)
Dept: ADMINISTRATIVE | Facility: CLINIC | Age: 65
End: 2020-12-31

## 2020-12-31 NOTE — TELEPHONE ENCOUNTER
Has had shingles for the past 3 weeks. They are drying up. He is still having real bad pain on right side leading toward his back where shingles were. Has been 7 days and has not had a BM. He took magnesia about 6 hours ago. Usually goes every 4 days.    Reason for Disposition   Last bowel movement (BM) > 4 days ago    Additional Information   Negative: [1] Abdomen pain is main symptom AND [2] male   Negative: [1] Abdomen pain is main symptom AND [2] adult female   Negative: Rectal bleeding or blood in stool is main symptom   Negative: Rectal pain or itching is main symptom   Negative: Constipation in a cancer patient who is currently (or recently) receiving chemotherapy or radiation therapy, or cancer patient who has metastatic or end-stage cancer and is receiving palliative care   Negative: Patient sounds very sick or weak to the triager   Negative: [1] Vomiting AND [2] abdomen looks much more swollen than usual   Negative: [1] Vomiting AND [2] contains bile (green color)   Negative: [1] Constant abdominal pain AND [2] present > 2 hours   Negative: [1] Rectal pain or fullness from fecal impaction (rectum full of stool) AND [2] NOT better after SITZ bath, suppository or enema   Negative: [1] Intermittent mild abdominal pain AND [2] fever   Negative: Abdomen is more swollen than usual    Protocols used: CONSTIPATION-A-AH

## 2021-01-12 ENCOUNTER — TELEPHONE (OUTPATIENT)
Dept: NEUROLOGY | Facility: CLINIC | Age: 66
End: 2021-01-12

## 2021-01-12 ENCOUNTER — TELEPHONE (OUTPATIENT)
Dept: FAMILY MEDICINE | Facility: CLINIC | Age: 66
End: 2021-01-12

## 2021-05-03 ENCOUNTER — TELEPHONE (OUTPATIENT)
Dept: INTERNAL MEDICINE | Facility: CLINIC | Age: 66
End: 2021-05-03

## 2021-05-04 ENCOUNTER — PATIENT MESSAGE (OUTPATIENT)
Dept: RESEARCH | Facility: HOSPITAL | Age: 66
End: 2021-05-04

## 2022-05-20 ENCOUNTER — OFFICE VISIT (OUTPATIENT)
Dept: OTOLARYNGOLOGY | Facility: CLINIC | Age: 67
End: 2022-05-20
Payer: MEDICARE

## 2022-05-20 VITALS
SYSTOLIC BLOOD PRESSURE: 114 MMHG | DIASTOLIC BLOOD PRESSURE: 76 MMHG | HEART RATE: 77 BPM | BODY MASS INDEX: 29.57 KG/M2 | TEMPERATURE: 99 F | WEIGHT: 183.19 LBS

## 2022-05-20 DIAGNOSIS — H93.13 TINNITUS OF BOTH EARS: ICD-10-CM

## 2022-05-20 DIAGNOSIS — H91.90 PERCEIVED HEARING CHANGES: Primary | ICD-10-CM

## 2022-05-20 DIAGNOSIS — R04.0 EPISTAXIS: ICD-10-CM

## 2022-05-20 DIAGNOSIS — H61.23 BILATERAL IMPACTED CERUMEN: ICD-10-CM

## 2022-05-20 PROCEDURE — 69210 REMOVE IMPACTED EAR WAX UNI: CPT | Mod: S$PBB,,, | Performed by: PHYSICIAN ASSISTANT

## 2022-05-20 PROCEDURE — 99204 PR OFFICE/OUTPT VISIT, NEW, LEVL IV, 45-59 MIN: ICD-10-PCS | Mod: 25,S$PBB,, | Performed by: PHYSICIAN ASSISTANT

## 2022-05-20 PROCEDURE — 99999 PR PBB SHADOW E&M-EST. PATIENT-LVL III: ICD-10-PCS | Mod: PBBFAC,,, | Performed by: PHYSICIAN ASSISTANT

## 2022-05-20 PROCEDURE — 69210 REMOVE IMPACTED EAR WAX UNI: CPT | Mod: PBBFAC | Performed by: PHYSICIAN ASSISTANT

## 2022-05-20 PROCEDURE — 99204 OFFICE O/P NEW MOD 45 MIN: CPT | Mod: 25,S$PBB,, | Performed by: PHYSICIAN ASSISTANT

## 2022-05-20 PROCEDURE — 99999 PR PBB SHADOW E&M-EST. PATIENT-LVL III: CPT | Mod: PBBFAC,,, | Performed by: PHYSICIAN ASSISTANT

## 2022-05-20 PROCEDURE — 99213 OFFICE O/P EST LOW 20 MIN: CPT | Mod: PBBFAC | Performed by: PHYSICIAN ASSISTANT

## 2022-05-20 PROCEDURE — 69210 PR REMOVAL IMPACTED CERUMEN REQUIRING INSTRUMENTATION, UNILATERAL: ICD-10-PCS | Mod: S$PBB,,, | Performed by: PHYSICIAN ASSISTANT

## 2022-05-20 RX ORDER — ZINC GLUCONATE 50 MG
50 TABLET ORAL DAILY
COMMUNITY

## 2022-05-20 RX ORDER — ASCORBIC ACID 500 MG
500 TABLET ORAL
COMMUNITY

## 2022-05-20 RX ORDER — MUPIROCIN 20 MG/G
OINTMENT TOPICAL 2 TIMES DAILY
Qty: 15 G | Refills: 3 | Status: SHIPPED | OUTPATIENT
Start: 2022-05-20 | End: 2022-05-30

## 2022-05-20 NOTE — PROGRESS NOTES
"Subjective:       Patient ID: Gonzalez Cuevas is a 66 y.o. male.    Chief Complaint: Ear Fullness (Pt states both ears are "stopped up" x1 week. States pain behind left ear radiating down neck. States has blood when blows nose)    Patient is a very pleasant 66 y.o. male here to see me today for the first time for evaluation of stopped sensation in both ears over the past one week; left side is worse currently.  He has known hearing loss AU and has old hearing aids that he doesn't always wear from [employer]; states he was told they were outdated.  He describes pressure in his left ear over past one week.  He has tried flushing his ears with peroxide but didn't get much out of the right ear.  He has noticed a difference in hearing between the ears, with the left ear being the better hearing ear.  He has noted tinnitus in both ears for years, no recent change.  He has not had any recent issues with ear pain or ear drainage.  He denies a family history of hearing loss, and has not had any previous otologic surgery.  He has a history of significant loud noise exposure. He denies issues with dizziness currently but has had vertigo in the past.  He has been having clear nasal drainage and has noticed blood on tissues when blowing his nose past 2 days.  He does not typically use nasal sprays but used an OTC spray over the weekend.  He also reports working around lots of dust and has been mowing grass.  He is not on blood thinners.  Mostly anterior epistaxis; small clot came out of right nostril early this AM.          Review of Systems   Constitutional: Negative for fever.   HENT: Positive for ear pain (pressure AS), hearing loss, nosebleeds, rhinorrhea, sinus pressure/congestion, sneezing and tinnitus. Negative for ear discharge and sore throat.    Eyes: Negative.    Respiratory: Positive for cough.    Cardiovascular: Negative.    Endocrine: Negative.    Genitourinary: Negative.    Musculoskeletal: Positive for back " pain and gait problem.   Integumentary:  Negative.   Allergic/Immunologic: Negative.    Neurological: Positive for dizziness. Negative for headaches.   Hematological: Negative.    Psychiatric/Behavioral: Negative.  Negative for confusion.         Objective:      Physical Exam  Vitals reviewed.   Constitutional:       Appearance: He is well-developed. He is not ill-appearing.   HENT:      Head: Normocephalic and atraumatic.      Right Ear: Ear canal and external ear normal. Decreased hearing noted. There is impacted cerumen (removal described below).      Left Ear: Ear canal and external ear normal. Decreased hearing noted. There is impacted cerumen.      Nose: Septal deviation (spur on right) present. No nasal deformity, mucosal edema, congestion or rhinorrhea.      Right Nostril: No epistaxis.      Left Nostril: No epistaxis.      Right Sinus: No maxillary sinus tenderness or frontal sinus tenderness.      Left Sinus: No maxillary sinus tenderness or frontal sinus tenderness.      Mouth/Throat:      Mouth: Mucous membranes are moist. Mucous membranes are not pale and not dry.      Pharynx: Uvula midline. No oropharyngeal exudate or posterior oropharyngeal erythema.   Eyes:      General: Lids are normal. No scleral icterus.     Extraocular Movements:      Right eye: Normal extraocular motion and no nystagmus.      Left eye: Normal extraocular motion and no nystagmus.      Conjunctiva/sclera: Conjunctivae normal.      Right eye: Right conjunctiva is not injected. No chemosis.     Left eye: Left conjunctiva is not injected. No chemosis.     Pupils: Pupils are equal, round, and reactive to light.   Neck:      Trachea: Trachea and phonation normal. No tracheal tenderness or tracheal deviation.   Pulmonary:      Effort: Pulmonary effort is normal. No respiratory distress.   Abdominal:      General: There is no distension.   Lymphadenopathy:      Head:      Right side of head: No submental, submandibular, preauricular or  posterior auricular adenopathy.      Left side of head: No submental, submandibular, preauricular or posterior auricular adenopathy.      Cervical: No cervical adenopathy.   Skin:     General: Skin is warm and dry.      Findings: No erythema or rash.   Neurological:      Mental Status: He is alert and oriented to person, place, and time.      Cranial Nerves: No cranial nerve deficit.   Psychiatric:         Speech: Speech normal.         Behavior: Behavior normal. Behavior is cooperative.           Procedure Note    CHIEF COMPLAINT:  Cerumen Impaction    Description:  The patient was seated in an exam chair.  An ear speculum was placed in the right EAC and was examined under the microscope.  Suction and/or loop curettes were used to remove a large cerumen impaction.  The tympanic membrane was visualized and was normal in appearance.  The procedure was repeated on the left side in a similar fashion.  The TM was intact and normal on this side as well.  The patient tolerated the procedure well.      AUDIOGRAM 2015:            Assessment:       Problem List Items Addressed This Visit    None     Visit Diagnoses     Perceived hearing changes    -  Primary    Tinnitus of both ears        Epistaxis        Bilateral impacted cerumen              Plan:         I recommend scheduling an audiogram when motivated.  They prefer to have it done closer to home ([place]) and will likely go to [facility] for that.  His last audiogram in chart is dated 2015 with [facility].  He notes improvement in his hearing and relief in left ear pressure once cerumen removed today.  We also discussed that tinnitus is most often caused by a hearing loss, and that as the hair cells are damaged, either genetic or as a result of loud noise exposure, they then cause tinnitus.  Some patients find that restricting the salt or caffeine in their diet helps, and there is also an OTC supplement, lipoflavinoids, that some people find to be effective though  their benefit is not fully proven.  Tinnitus tends to be louder in times of stress and fatigue, and may decrease with time.  Sound machines may also be an effective masking technique if needed at night.     Cerumen impaction:  Removed today without difficulty.  I would recommend the use of a wax softening drop, either over the counter Debrox or mineral oil, on a weekly basis.  I also instructed the patient to avoid Qtips.    Epistaxis: We discussed different strategies to help increase his nasal moisture, including the use of saline spray throughout the day and a humidifier at night. In addition, I gave the patient a prescription for Bactroban to be used twice daily for two weeks, and he may use Afrin as needed for active bleeding.  Instructed him to RTC with any persistent or worsening epistaxis.         Answers for HPI/ROS submitted by the patient on 5/20/2022  Fatigue (Tiredness)?: Yes  Snoring?: Yes  heartburn: Yes

## 2022-05-20 NOTE — PROGRESS NOTES
Answers for HPI/ROS submitted by the patient on 5/20/2022  Fatigue (Tiredness)?: Yes  hearing loss: Yes  ear pain: Yes  sinus pressure : Yes  sore throat: Yes  None of these : Yes  Snoring?: Yes  cough: Yes  None of these : Yes  heartburn: Yes  None of these: Yes  back pain: Yes  neck pain: Yes  None of these : Yes  None of these: Yes  None of these : Yes  dizziness: Yes  None of these: Yes  None of these: Yes

## 2024-01-30 ENCOUNTER — OFFICE VISIT (OUTPATIENT)
Dept: SURGERY | Facility: CLINIC | Age: 69
End: 2024-01-30
Payer: MEDICARE

## 2024-01-30 VITALS
DIASTOLIC BLOOD PRESSURE: 73 MMHG | WEIGHT: 191.25 LBS | HEART RATE: 86 BPM | BODY MASS INDEX: 30.74 KG/M2 | SYSTOLIC BLOOD PRESSURE: 121 MMHG | HEIGHT: 66 IN

## 2024-01-30 DIAGNOSIS — D17.0 LIPOMA OF NECK: Primary | ICD-10-CM

## 2024-01-30 PROCEDURE — 99999 PR PBB SHADOW E&M-EST. PATIENT-LVL III: CPT | Mod: PBBFAC,,, | Performed by: STUDENT IN AN ORGANIZED HEALTH CARE EDUCATION/TRAINING PROGRAM

## 2024-01-30 PROCEDURE — 99213 OFFICE O/P EST LOW 20 MIN: CPT | Mod: PBBFAC,PN | Performed by: STUDENT IN AN ORGANIZED HEALTH CARE EDUCATION/TRAINING PROGRAM

## 2024-01-30 PROCEDURE — 99204 OFFICE O/P NEW MOD 45 MIN: CPT | Mod: S$PBB,,, | Performed by: STUDENT IN AN ORGANIZED HEALTH CARE EDUCATION/TRAINING PROGRAM

## 2024-01-30 NOTE — PROGRESS NOTES
Patient ID: Gonzalez Cuevas is a 68 y.o. male.    Chief Complaint: No chief complaint on file.      HPI:  HPI  68M with subq mass just behind left ear. Noted some time after posterior neck lipoma excised by dr henao in 2016. Says was about the size of a dime then. Now about the size of a quarter. No pain, no drainage.     Review of Systems   Constitutional:  Negative for chills, diaphoresis and fever.   HENT:  Negative for trouble swallowing.    Respiratory:  Negative for cough, shortness of breath, wheezing and stridor.    Cardiovascular:  Negative for chest pain and palpitations.   Gastrointestinal:  Negative for abdominal distention, abdominal pain, blood in stool, diarrhea, nausea and vomiting.   Endocrine: Negative for cold intolerance and heat intolerance.   Genitourinary:  Negative for difficulty urinating.   Musculoskeletal:  Negative for back pain.   Skin:  Negative for rash.   Allergic/Immunologic: Negative for immunocompromised state.   Neurological:  Negative for dizziness, syncope and numbness.   Hematological:  Negative for adenopathy.   Psychiatric/Behavioral:  Negative for agitation.        Current Outpatient Medications   Medication Sig Dispense Refill    ascorbic acid, vitamin C, (VITAMIN C) 500 MG tablet Take 500 mg by mouth.      zinc gluconate 50 mg tablet Take 50 mg by mouth once daily.       Current Facility-Administered Medications   Medication Dose Route Frequency Provider Last Rate Last Admin    lidocaine HCL 10 mg/ml (1%) injection 10 mL  10 mL Infiltration Once Wilmer Mallory MD        lidocaine HCL 2% jelly   Topical (Top) Once Wilmer Mallory MD           Review of patient's allergies indicates:   Allergen Reactions    Flomax [tamsulosin] Other (See Comments)     Severe hypotension    Morphine Other (See Comments)     Tremors & hypotension       Past Medical History:   Diagnosis Date    Bleeding stomach ulcer 1992,1994    DDD (degenerative disc disease), lumbar     Encounter  for blood transfusion     Hearing difficulty     Retinal tear of right eye 2015    with floaters    Syncope and collapse     4x since 2005    Ulcer     Vertigo     Vertigo        Past Surgical History:   Procedure Laterality Date    BLADDER FULGURATION N/A 7/18/2018    Procedure: FULGURATION, BLADDER NECK;  Surgeon: Lexx Hilario MD;  Location: [facility] OR;  Service: Urology;  Laterality: N/A;    BLADDER SURGERY      blockage age 10-11    CYSTOSCOPY N/A 7/18/2018    Procedure: CYSTOSCOPY;  Surgeon: Lexx Hilario MD;  Location: [facility] OR;  Service: Urology;  Laterality: N/A;    LIPOMA RESECTION  2016    posterior neck    REMOVAL OF BLOOD CLOT N/A 7/18/2018    Procedure: REMOVAL, BLOOD CLOT;  Surgeon: Lexx Hilario MD;  Location: [facility] OR;  Service: Urology;  Laterality: N/A;    TRANSURETHRAL RESECTION OF PROSTATE N/A 8/7/2018    Procedure: PROSTATECTOMY-TRANSURETHRAL;  Surgeon: Frankie Macias MD;  Location: [facility] OR;  Service: Urology;  Laterality: N/A;       Social History     Socioeconomic History    Marital status:    Tobacco Use    Smoking status: Never    Smokeless tobacco: Never   Substance and Sexual Activity    Alcohol use: No    Drug use: No    Sexual activity: Not Currently     Partners: Female       Vitals:    01/30/24 1508   BP: 121/73   Pulse: 86       Physical Exam  Constitutional:       General: He is not in acute distress.  HENT:      Head: Normocephalic and atraumatic.     Eyes:      General: No scleral icterus.  Cardiovascular:      Rate and Rhythm: Normal rate.   Pulmonary:      Effort: Pulmonary effort is normal. No respiratory distress.      Breath sounds: No stridor.   Abdominal:      Palpations: Abdomen is soft.      Tenderness: There is no abdominal tenderness.   Lymphadenopathy:      Cervical: No cervical adenopathy.   Skin:     General: Skin is warm.      Findings: No erythema.   Neurological:      Mental Status: He is alert and oriented to person, place, and time.    Psychiatric:         Behavior: Behavior normal.     Body mass index is 30.87 kg/m².  Path - neck mass 2016 - consistent with lipoma    Assessment & Plan:  68M with right neck lipoma  Not bothersome  Seems to be growing slowly  Will let us know if he desires removal  Would plan in OR

## 2024-03-26 ENCOUNTER — OFFICE VISIT (OUTPATIENT)
Dept: CARDIOLOGY | Facility: CLINIC | Age: 69
End: 2024-03-26
Payer: MEDICARE

## 2024-03-26 VITALS
DIASTOLIC BLOOD PRESSURE: 80 MMHG | RESPIRATION RATE: 18 BRPM | OXYGEN SATURATION: 96 % | WEIGHT: 189.63 LBS | SYSTOLIC BLOOD PRESSURE: 132 MMHG | HEART RATE: 68 BPM | BODY MASS INDEX: 30.47 KG/M2 | HEIGHT: 66 IN

## 2024-03-26 DIAGNOSIS — I27.20 PULMONARY HTN: ICD-10-CM

## 2024-03-26 DIAGNOSIS — R06.02 SHORTNESS OF BREATH: Primary | ICD-10-CM

## 2024-03-26 DIAGNOSIS — Z13.220 LIPID SCREENING: ICD-10-CM

## 2024-03-26 DIAGNOSIS — R42 DIZZINESS: ICD-10-CM

## 2024-03-26 DIAGNOSIS — E78.00 HYPERCHOLESTEROLEMIA: ICD-10-CM

## 2024-03-26 DIAGNOSIS — I70.0 ATHEROSCLEROSIS OF AORTA: ICD-10-CM

## 2024-03-26 PROCEDURE — 93010 ELECTROCARDIOGRAM REPORT: CPT | Mod: S$PBB | Performed by: INTERNAL MEDICINE

## 2024-03-26 PROCEDURE — 93005 ELECTROCARDIOGRAM TRACING: CPT | Mod: PBBFAC | Performed by: INTERNAL MEDICINE

## 2024-03-26 PROCEDURE — 99999 EKG 12-LEAD: CPT | Mod: PBBFAC,,, | Performed by: INTERNAL MEDICINE

## 2024-03-27 LAB
OHS QRS DURATION: 76 MS
OHS QTC CALCULATION: 401 MS

## 2024-03-28 ENCOUNTER — HOSPITAL ENCOUNTER (OUTPATIENT)
Dept: PULMONOLOGY | Facility: HOSPITAL | Age: 69
Discharge: HOME | End: 2024-03-28
Attending: NURSE PRACTITIONER
Payer: MEDICARE

## 2024-03-28 DIAGNOSIS — R42 DIZZINESS: ICD-10-CM

## 2024-03-28 DIAGNOSIS — I70.0 ATHEROSCLEROSIS OF AORTA: ICD-10-CM

## 2024-03-28 DIAGNOSIS — R06.02 SHORTNESS OF BREATH: ICD-10-CM

## 2024-03-28 LAB
ASCENDING AORTA: 2.48 CM
AV INDEX (PROSTH): 1.05
AV MEAN GRADIENT: 3 MMHG
AV PEAK GRADIENT: 5 MMHG
AV VALVE AREA BY VELOCITY RATIO: 3.3 CM²
AV VALVE AREA: 3.43 CM²
AV VELOCITY RATIO: 1.01
CV ECHO LV RWT: 0.44 CM
DOP CALC AO PEAK VEL: 1.08 M/S
DOP CALC AO VTI: 24.2 CM
DOP CALC LVOT AREA: 3.3 CM2
DOP CALC LVOT DIAMETER: 2.04 CM
DOP CALC LVOT PEAK VEL: 1.09 M/S
DOP CALC LVOT STROKE VOLUME: 82.98 CM3
DOP CALC RVOT PEAK VEL: 0.76 M/S
DOP CALC RVOT VTI: 17.6 CM
DOP CALCLVOT PEAK VEL VTI: 25.4 CM
E WAVE DECELERATION TIME: 195.22 MSEC
E/A RATIO: 1.27
E/E' RATIO: 10 M/S
ECHO LV POSTERIOR WALL: 1.02 CM (ref 0.6–1.1)
FRACTIONAL SHORTENING: 50 % (ref 28–44)
INTERVENTRICULAR SEPTUM: 1.2 CM (ref 0.6–1.1)
IVRT: 140.82 MSEC
LA MAJOR: 5.31 CM
LA MINOR: 4.98 CM
LA WIDTH: 3.8 CM
LEFT ATRIUM SIZE: 3.29 CM
LEFT ATRIUM VOLUME MOD: 70.91 CM3
LEFT ATRIUM VOLUME: 54.62 CM3
LEFT INTERNAL DIMENSION IN SYSTOLE: 2.35 CM (ref 2.1–4)
LEFT VENTRICLE DIASTOLIC VOLUME: 101.12 ML
LEFT VENTRICLE SYSTOLIC VOLUME: 19.17 ML
LEFT VENTRICULAR INTERNAL DIMENSION IN DIASTOLE: 4.68 CM (ref 3.5–6)
LEFT VENTRICULAR MASS: 188.64 G
LV LATERAL E/E' RATIO: 11.25 M/S
LV SEPTAL E/E' RATIO: 9 M/S
LVOT MG: 2.92 MMHG
LVOT MV: 0.82 CM/S
MV PEAK A VEL: 0.71 M/S
MV PEAK E VEL: 0.9 M/S
MV STENOSIS PRESSURE HALF TIME: 56.61 MS
MV VALVE AREA P 1/2 METHOD: 3.89 CM2
PISA TR MAX VEL: 3.13 M/S
PULM VEIN S/D RATIO: 1.78
PV MEAN GRADIENT: 2 MMHG
PV MV: 0.76 M/S
PV PEAK D VEL: 0.45 M/S
PV PEAK GRADIENT: 4 MMHG
PV PEAK S VEL: 0.8 M/S
PV PEAK VELOCITY: 0.99 M/S
RA MAJOR: 4.62 CM
RA PRESSURE ESTIMATED: 3 MMHG
RIGHT VENTRICULAR END-DIASTOLIC DIMENSION: 3.24 CM
RV TB RVSP: 6 MMHG
SINUS: 3.22 CM
STJ: 2.81 CM
TDI LATERAL: 0.08 M/S
TDI SEPTAL: 0.1 M/S
TDI: 0.09 M/S
TR MAX PG: 39 MMHG
TV REST PULMONARY ARTERY PRESSURE: 42 MMHG

## 2024-03-28 PROCEDURE — 93306 TTE W/DOPPLER COMPLETE: CPT

## 2024-03-28 PROCEDURE — 93306 TTE W/DOPPLER COMPLETE: CPT | Mod: 26,,, | Performed by: INTERNAL MEDICINE

## 2024-04-10 ENCOUNTER — HOSPITAL ENCOUNTER (OUTPATIENT)
Dept: PULMONOLOGY | Facility: HOSPITAL | Age: 69
Discharge: HOME | End: 2024-04-10
Attending: NURSE PRACTITIONER
Payer: MEDICARE

## 2024-04-10 DIAGNOSIS — R06.02 SHORTNESS OF BREATH: ICD-10-CM

## 2024-04-10 DIAGNOSIS — R42 DIZZINESS: ICD-10-CM

## 2024-04-10 DIAGNOSIS — I70.0 ATHEROSCLEROSIS OF AORTA: ICD-10-CM

## 2024-04-10 DIAGNOSIS — I27.20 PULMONARY HTN: Primary | ICD-10-CM

## 2024-04-10 LAB
CV STRESS BASE HR: 69 BPM
DIASTOLIC BLOOD PRESSURE: 80 MMHG
OHS CV CPX 1 MINUTE RECOVERY HEART RATE: 91 BPM
OHS CV CPX 85 PERCENT MAX PREDICTED HEART RATE MALE: 129
OHS CV CPX ESTIMATED METS: 5
OHS CV CPX MAX PREDICTED HEART RATE: 152
OHS CV CPX PATIENT IS FEMALE: 0
OHS CV CPX PATIENT IS MALE: 1
OHS CV CPX PEAK DIASTOLIC BLOOD PRESSURE: 89 MMHG
OHS CV CPX PEAK HEAR RATE: 117 BPM
OHS CV CPX PEAK RATE PRESSURE PRODUCT: NORMAL
OHS CV CPX PEAK SYSTOLIC BLOOD PRESSURE: 198 MMHG
OHS CV CPX PERCENT MAX PREDICTED HEART RATE ACHIEVED: 77
OHS CV CPX RATE PRESSURE PRODUCT PRESENTING: NORMAL
STRESS ECHO POST EXERCISE DUR MIN: 4 MINUTES
STRESS ECHO POST EXERCISE DUR SEC: 33 SECONDS
SYSTOLIC BLOOD PRESSURE: 152 MMHG

## 2024-04-10 PROCEDURE — 93017 CV STRESS TEST TRACING ONLY: CPT

## 2024-04-10 PROCEDURE — 93018 CV STRESS TEST I&R ONLY: CPT | Mod: ,,, | Performed by: INTERNAL MEDICINE

## 2024-04-10 PROCEDURE — 93016 CV STRESS TEST SUPVJ ONLY: CPT | Mod: ,,, | Performed by: INTERNAL MEDICINE

## 2024-04-12 ENCOUNTER — TELEPHONE (OUTPATIENT)
Dept: CARDIOLOGY | Facility: CLINIC | Age: 69
End: 2024-04-12
Payer: MEDICARE

## 2024-04-12 DIAGNOSIS — R06.09 OTHER FORMS OF DYSPNEA: ICD-10-CM

## 2024-04-12 DIAGNOSIS — I27.20 PULMONARY HTN: ICD-10-CM

## 2024-04-12 DIAGNOSIS — R07.9 ACUTE CHEST PAIN: Primary | ICD-10-CM

## 2024-04-12 DIAGNOSIS — N19 RENAL FAILURE, UNSPECIFIED CHRONICITY: ICD-10-CM

## 2024-04-12 RX ORDER — OMEPRAZOLE 20 MG/1
20 CAPSULE, DELAYED RELEASE ORAL DAILY
Qty: 90 CAPSULE | Refills: 0 | Status: SHIPPED | OUTPATIENT
Start: 2024-04-12 | End: 2024-04-12 | Stop reason: SDUPTHER

## 2024-04-12 RX ORDER — FUROSEMIDE 20 MG/1
20 TABLET ORAL DAILY
Qty: 90 TABLET | Refills: 3 | Status: SHIPPED | OUTPATIENT
Start: 2024-04-12 | End: 2025-04-12

## 2024-04-12 RX ORDER — FUROSEMIDE 20 MG/1
20 TABLET ORAL DAILY
Qty: 30 TABLET | Refills: 11 | Status: SHIPPED | OUTPATIENT
Start: 2024-04-12 | End: 2024-04-12 | Stop reason: SDUPTHER

## 2024-04-12 RX ORDER — OMEPRAZOLE 20 MG/1
20 CAPSULE, DELAYED RELEASE ORAL DAILY
Qty: 90 CAPSULE | Refills: 0 | Status: SHIPPED | OUTPATIENT
Start: 2024-04-12 | End: 2025-04-12

## 2024-04-12 NOTE — TELEPHONE ENCOUNTER
----- Message from Edwige Owens NP sent at 4/12/2024 12:51 PM CDT -----  The stress testing was not concerning for blockages, however this study was very limited due to the amount of time spent during testing. We may discuss next visit repeating stress testing in an alternative way to get the information for accurate resu  lts.

## 2024-04-12 NOTE — PROGRESS NOTES
The stress testing was not concerning for blockages, however this study was very limited due to the amount of time spent during testing. We may discuss next visit repeating stress testing in an alternative way to get the information for accurate results.

## 2024-04-12 NOTE — TELEPHONE ENCOUNTER
----- Message from Sejal Fairchild sent at 2024  9:02 AM CDT -----  Contact: WIFE - JULIO Cuevas  MRN: [id number]  : 1955  PCP: [facility], Primary Doctor  Home Phone      [phone]  Work Phone      Not on file.  Mobile          [phone]      MESSAGE: Patient is following up on a message that was supposedly left yesterday but no message in the chart.  She is checking on the medication that Edwige prescribed him at his last office visit.  She would like the medication [facility].            Phone: [phone]

## 2024-04-16 ENCOUNTER — HOSPITAL ENCOUNTER (OUTPATIENT)
Dept: RADIOLOGY | Facility: HOSPITAL | Age: 69
Discharge: HOME | End: 2024-04-16
Attending: NURSE PRACTITIONER
Payer: MEDICARE

## 2024-04-16 DIAGNOSIS — R07.9 ACUTE CHEST PAIN: ICD-10-CM

## 2024-04-16 LAB
CREAT SERPL-MCNC: 1.4 MG/DL (ref 0.5–1.4)
SAMPLE: NORMAL

## 2024-04-16 PROCEDURE — 71275 CT ANGIOGRAPHY CHEST: CPT | Mod: TC

## 2024-04-16 PROCEDURE — 25500020 PHARM REV CODE 255: Performed by: NURSE PRACTITIONER

## 2024-04-16 PROCEDURE — 71275 CT ANGIOGRAPHY CHEST: CPT | Mod: 26,,, | Performed by: RADIOLOGY

## 2024-04-16 RX ADMIN — IOHEXOL 75 ML: 350 INJECTION, SOLUTION INTRAVENOUS at 05:04

## 2024-04-17 ENCOUNTER — TELEPHONE (OUTPATIENT)
Dept: CARDIOLOGY | Facility: CLINIC | Age: 69
End: 2024-04-17
Payer: MEDICARE

## 2024-04-17 DIAGNOSIS — N18.31 STAGE 3A CHRONIC KIDNEY DISEASE: Primary | ICD-10-CM

## 2024-04-17 NOTE — TELEPHONE ENCOUNTER
Patient expressed verbal understanding , I reminded him of his follow up appointment scheduled for 04/23 to discuss any questions and to go over treatment options.

## 2024-04-17 NOTE — PROGRESS NOTES
Chest CTA reveals no lung clots or acute lung disease. The heart is without coronary disease but aorta reveals some mild plaquing. Further discussion and treatment to be discussed next visit.

## 2024-04-17 NOTE — TELEPHONE ENCOUNTER
----- Message from Edwige Owens NP sent at 4/17/2024 12:29 PM CDT -----  Kidney function with improvement, but still abnormal since last checked 2018 per [facility] records. Chronic kidney disease as discussed at last visit. I will refer you to a kidney specialist.

## 2024-04-17 NOTE — TELEPHONE ENCOUNTER
----- Message from Edwige Owens NP sent at 4/17/2024 12:24 PM CDT -----  Chest CTA reveals no lung clots or acute lung disease. The heart is without coronary disease but aorta reveals some mild plaquing. Further discussion and treatment to be discussed next visit.   
Patient expressed verbal understanding , I reminded him of his follow up appointment scheduled for 04/23/2024 to discuss any questions and further treatment options.    
PAST SURGICAL HISTORY:  No significant past surgical history

## 2024-04-23 ENCOUNTER — OFFICE VISIT (OUTPATIENT)
Dept: CARDIOLOGY | Facility: CLINIC | Age: 69
End: 2024-04-23
Payer: MEDICARE

## 2024-04-23 VITALS
DIASTOLIC BLOOD PRESSURE: 80 MMHG | RESPIRATION RATE: 18 BRPM | BODY MASS INDEX: 29.41 KG/M2 | WEIGHT: 183 LBS | SYSTOLIC BLOOD PRESSURE: 126 MMHG | HEIGHT: 66 IN | OXYGEN SATURATION: 97 % | HEART RATE: 68 BPM

## 2024-04-23 DIAGNOSIS — R94.31 ABNORMAL ELECTROCARDIOGRAM (ECG) (EKG): Primary | ICD-10-CM

## 2024-04-23 DIAGNOSIS — Z91.199 MEDICALLY NONCOMPLIANT: ICD-10-CM

## 2024-04-23 DIAGNOSIS — E78.00 HYPERCHOLESTEROLEMIA: ICD-10-CM

## 2024-04-23 DIAGNOSIS — R42 DIZZINESS: ICD-10-CM

## 2024-04-23 DIAGNOSIS — R55 PRE-SYNCOPE: ICD-10-CM

## 2024-04-23 DIAGNOSIS — R06.09 DOE (DYSPNEA ON EXERTION): ICD-10-CM

## 2024-04-23 DIAGNOSIS — I70.0 ATHEROSCLEROSIS OF AORTA: ICD-10-CM

## 2024-04-23 NOTE — PROGRESS NOTES
Cardiology Clinic note    Subjective:   Patient ID:  Gonzalez Cuevas is a 68 y.o. male who presents for follow-up of pHTN    HPI:   Gonzalez Cuevas  has a past medical history of Bleeding stomach ulcer (1992,1994), DDD (degenerative disc disease), lumbar, Encounter for blood transfusion, Hearing difficulty, Retinal tear of right eye (2015), Syncope and collapse, Ulcer, Vertigo, and Vertigo.      Last visit was seen for evaluation SOB, dizziness while chopping wood  Also reported right side head pain   2017- passed out and got overheated     Echo 3/2024    Left Ventricle: There is normal systolic function with a visually estimated ejection fraction of 60 - 65%. There is normal diastolic function.    Right Ventricle: Normal right ventricular cavity size. Wall thickness is normal. Right ventricle wall motion  is normal. Systolic function is normal.    Left Atrium: Left atrium is moderately dilated.    Aortic Valve: The aortic valve is a trileaflet valve.    Tricuspid Valve: There is mild regurgitation.    Pulmonic Valve: There is mild regurgitation.    Pulmonary Artery: There is borderline elevated pulmonary hypertension. The estimated pulmonary artery systolic pressure is 42 mmHg.    Exercise EKG    The ECG portion of the study is negative for ischemia. 77% of the age predicted maximum heart rate obtained.    The patient reported no chest pain during the stress test.    The blood pressure response to stress was normal.    There were no arrhythmias during stress.     Last seen by cardiology 2015  Holter 2015; benign      CKD stage 3a; last creatinine 6.0 2018     Atherosclerosis of aorta; per imaging studies      Hypercholesterolemia; no statin        Patient Active Problem List    Diagnosis Date Noted    Stage 3a chronic kidney disease 04/17/2024    Benign non-nodular prostatic hyperplasia with lower urinary tract symptoms 08/07/2018    Urinary retention 07/18/2018    BPH associated with nocturia  "03/23/2018    Incomplete bladder emptying 03/23/2018    Family history of prostate cancer in father 03/23/2018    Chronic low back pain without sciatica 03/23/2018    Urge incontinence 03/23/2018    Sensorineural hearing loss 12/17/2015    Posterior vitreous detachment, right eye 12/04/2015    Floater, vitreous 12/04/2015    White without pressure of peripheral retina of right eye  12/04/2015     Dx updated per 2019 IMO Load      Peripheral retinal degeneration, 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    OMEPRAZOLE (PRILOSEC) 20 MG CAPSULE    Take 1 capsule (20 mg total) by mouth once daily.   Modified Medications    No medications on file   Discontinued Medications    No medications on file        Review of Systems   Constitutional: Negative.   Cardiovascular:  Positive for dyspnea on exertion.   Respiratory:  Positive for shortness of breath.    Skin: Negative.    Musculoskeletal: Negative.    Neurological:  Positive for dizziness and light-headedness.         Objective:   Vitals  Vitals:    04/23/24 0930   BP: 126/80   Pulse: 68   Resp: 18   SpO2: 97%   Weight: 83 kg (182 lb 15.7 oz)   Height: 5' 6" (1.676 m)          Physical Exam  Cardiovascular:      Rate and Rhythm: Normal rate and regular rhythm.      Pulses: Normal pulses.      Heart sounds: Normal heart sounds.   Pulmonary:      Breath sounds: Normal breath sounds.   Musculoskeletal:         General: Normal range of motion.   Skin:     General: Skin is warm.      Capillary Refill: Capillary refill takes less than 2 seconds.   Neurological:      Mental Status: He is alert and oriented to person, place, and time.   Psychiatric:         Mood and Affect: Mood normal. " "          Lab Results    Lab Results   Component Value Date    WBC 3.75 (L) 04/16/2024    HGB 14.9 04/16/2024    HCT 44.6 04/16/2024    MCV 90 04/16/2024       Lab Results   Component Value Date     04/16/2024       Lab Results   Component Value Date    K 3.8 04/16/2024    MG 2.6 09/10/2015    BUN 11 04/16/2024    CREATININE 1.3 04/16/2024       Lab Results   Component Value Date    GLU 72 04/16/2024       Lab Results   Component Value Date    AST 20 04/16/2024    ALT 10 04/16/2024    ALBUMIN 4.0 04/16/2024    PROT 7.2 04/16/2024       Lab Results   Component Value Date    CHOL 203 (H) 09/10/2015    HDL 55 09/10/2015    LDLCALC 135.0 09/10/2015    TRIG 65 09/10/2015       No results found for: "CRP", "BNP"    Assessment:     Problem List Items Addressed This Visit    None      Plan:     F/u nephrology  F/u Lexiscan  Lasix for pulm HTN; states it causes dry mouth   Noncompliance  Start ASA  Check lipids; will recommend statin after   Notified patient will need cholesterol     Follow up after the above.   Return sooner for concerns or questions. If symptoms persist go to the ED    He expressed verbal understanding and agreed with the plan    Thank you for the opportunity to care for this patient. Will be available for questions if needed.     Total duration of face to face visit time 30 minutes.  Total time spent counseling greater than fifty percent of total visit time.  Counseling included discussion regarding imaging findings, diagnosis, possibilities, treatment options, risks and benefits.    ABISAI Knight-JAVID  Cardiology Clinic  [facility]         "

## 2024-05-01 PROBLEM — R42 DIZZINESS: Status: ACTIVE | Noted: 2024-05-01

## 2024-05-01 PROBLEM — I70.0 ATHEROSCLEROSIS OF AORTA: Status: ACTIVE | Noted: 2024-05-01

## 2024-05-01 NOTE — PROGRESS NOTES
[facility]    CC: SOB    Patient ID: Gonzalez Cuevas is a 68 y.o. male with a past medical history of SOB    HPI  Here today for evaluation of SOB, dizziness while chopping wood  Also reported right side head pain   2017- passed out and got overheated     Last seen by cardiology 2015  Holter 2015; benign     CKD stage 3a; last creatinine 6.0 2018    Atherosclerosis of aorta; per imaging studies     Hypercholesterolemia; no statin     Past Medical History:   Diagnosis Date    Bleeding stomach ulcer 1992,1994    DDD (degenerative disc disease), lumbar     Encounter for blood transfusion     Hearing difficulty     Retinal tear of right eye 2015    with floaters    Syncope and collapse     4x since 2005    Ulcer     Vertigo     Vertigo      Past Surgical History:   Procedure Laterality Date    BLADDER FULGURATION N/A 7/18/2018    Procedure: FULGURATION, BLADDER NECK;  Surgeon: Lexx Hilario MD;  Location: [facility] OR;  Service: Urology;  Laterality: N/A;    BLADDER SURGERY      blockage age 10-11    CYSTOSCOPY N/A 7/18/2018    Procedure: CYSTOSCOPY;  Surgeon: Lexx Hilario MD;  Location: [facility] OR;  Service: Urology;  Laterality: N/A;    LIPOMA RESECTION  2016    posterior neck    REMOVAL OF BLOOD CLOT N/A 7/18/2018    Procedure: REMOVAL, BLOOD CLOT;  Surgeon: Lexx Hilario MD;  Location: [facility] OR;  Service: Urology;  Laterality: N/A;    TRANSURETHRAL RESECTION OF PROSTATE N/A 8/7/2018    Procedure: PROSTATECTOMY-TRANSURETHRAL;  Surgeon: Frankie Macias MD;  Location: [facility] OR;  Service: Urology;  Laterality: N/A;     Social History     Socioeconomic History    Marital status:    Tobacco Use    Smoking status: Never    Smokeless tobacco: Never   Substance and Sexual Activity    Alcohol use: No    Drug use: No    Sexual activity: Not Currently     Partners: Female     Family History   Problem Relation Name Age of Onset    Diabetes Mother      Hypertension Mother      Cancer Father   "        Prostate Ca    Hypertension Father      Cancer Sister          intestional ca    Hypertension Brother         Review of patient's allergies indicates:   Allergen Reactions    Flomax [tamsulosin] Other (See Comments)     Severe hypotension    Morphine Other (See Comments)     Tremors & hypotension       Medication List with Changes/Refills   Changed and/or Refilled Medications    Modified Medication Previous Medication    FUROSEMIDE (LASIX) 20 MG TABLET furosemide (LASIX) 20 MG tablet       Take 1 tablet (20 mg total) by mouth once daily.    Take 1 tablet (20 mg total) by mouth once daily.    OMEPRAZOLE (PRILOSEC) 20 MG CAPSULE omeprazole (PRILOSEC) 20 MG capsule       Take 1 capsule (20 mg total) by mouth once daily.    Take 1 capsule (20 mg total) by mouth once daily.   Discontinued Medications    ASCORBIC ACID, VITAMIN C, (VITAMIN C) 500 MG TABLET    Take 500 mg by mouth.    ZINC GLUCONATE 50 MG TABLET    Take 50 mg by mouth once daily.           Review of Systems   Constitutional: Negative.   Cardiovascular: Negative.    Respiratory:  Positive for shortness of breath.    Musculoskeletal: Negative.    Neurological:  Positive for dizziness and light-headedness.       Vitals:    03/26/24 0943   BP: 132/80   Pulse: 68   Resp: 18   SpO2: 96%   Weight: 86 kg (189 lb 9.5 oz)   Height: 5' 6" (1.676 m)          Physical Exam  HENT:      Head: Normocephalic.   Cardiovascular:      Rate and Rhythm: Normal rate and regular rhythm.      Pulses: Normal pulses.      Heart sounds: Normal heart sounds.   Pulmonary:      Effort: Pulmonary effort is normal.      Breath sounds: Normal breath sounds.   Musculoskeletal:         General: Normal range of motion.      Cervical back: Normal range of motion.      Right lower leg: No edema.      Left lower leg: No edema.   Skin:     General: Skin is warm and dry.   Neurological:      Mental Status: He is alert and oriented to person, place, and time.   Psychiatric:         Mood and " "Affect: Mood normal.         Labs:  Most Recent Data  CBC:   Lab Results   Component Value Date    WBC 3.75 (L) 04/16/2024    HGB 14.9 04/16/2024    HCT 44.6 04/16/2024     04/16/2024    MCV 90 04/16/2024    RDW 12.9 04/16/2024     BMP:   Lab Results   Component Value Date     04/16/2024    K 3.8 04/16/2024     04/16/2024    CO2 25 04/16/2024    BUN 11 04/16/2024    CREATININE 1.3 04/16/2024    GLU 72 04/16/2024    CALCIUM 9.5 04/16/2024    MG 2.6 09/10/2015     LFTS;   Lab Results   Component Value Date    PROT 7.2 04/16/2024    ALBUMIN 4.0 04/16/2024    BILITOT 1.6 (H) 04/16/2024    AST 20 04/16/2024    ALKPHOS 76 04/16/2024    ALT 10 04/16/2024     COAGS: No results found for: "INR", "PROTIME", "PTT"  FLP:   Lab Results   Component Value Date    CHOL 203 (H) 09/10/2015    HDL 55 09/10/2015    LDLCALC 135.0 09/10/2015    TRIG 65 09/10/2015    CHOLHDL 27.1 09/10/2015     CARDIAC:   Lab Results   Component Value Date    TROPONINI <0.012 05/07/2018       Assessment/Plan:  Problem List Items Addressed This Visit          Pulmonary    Shortness of breath - Primary       Cardiac/Vascular    Hypercholesterolemia    Atherosclerosis of aorta       Other    Dizziness     Other Visit Diagnoses       Lipid screening              Not orthostatic today   Echocardiogram to assess LV function and valvular structure   Exercise EKG stress   Check CBC,CMP, lipid    Update; echo + pulm HTN  Trial of Lasix     Total duration of face to face visit time 30 minutes.  Total time spent counseling greater than fifty percent of total visit time.  Counseling included discussion regarding imaging findings, diagnosis, possibilities, treatment options, risks and benefits.  The patient had many questions regarding the options and long-term effects.    Edwige Owens, ABISAI-C  Cardiology Clinic  [facility]         "

## 2024-05-24 PROBLEM — Z91.199 MEDICALLY NONCOMPLIANT: Status: ACTIVE | Noted: 2024-05-24

## 2024-05-24 PROBLEM — R94.31 ABNORMAL ELECTROCARDIOGRAM (ECG) (EKG): Status: ACTIVE | Noted: 2024-05-24

## 2024-05-24 PROBLEM — R55 PRE-SYNCOPE: Status: ACTIVE | Noted: 2024-05-24

## (undated) DEVICE — Device

## (undated) DEVICE — ELECTRODE REM PLYHSV RETURN 9

## (undated) DEVICE — TUBING SUCTION SET FOR ENDOMAT

## (undated) DEVICE — BRUSH SCRUB SURGICALW/BETADINE

## (undated) DEVICE — ELECTRODE RESECTION BUTTON LRG

## (undated) DEVICE — SET BASIN 48X48IN 6000ML RING

## (undated) DEVICE — CYSTOSCOPE CONTINUOUS FLOW

## (undated) DEVICE — SET TUR BLADDER IRRIG Y TYPE

## (undated) DEVICE — BAG URINARY DRN 2000ML

## (undated) DEVICE — ELECTRODE RESECTSCP HI 24FR

## (undated) DEVICE — GLOVE BIOGEL SKINSENSE PI 7.5

## (undated) DEVICE — SOL IRR NACL .9% 3000ML

## (undated) DEVICE — EVACUATOR BLADDER UROVAC ADPT

## (undated) DEVICE — DEVICE ANC SW STAT FOLEY 6-24

## (undated) DEVICE — SOL 9P NACL IRR PIC IL